# Patient Record
Sex: FEMALE | Race: WHITE | NOT HISPANIC OR LATINO | Employment: OTHER | URBAN - METROPOLITAN AREA
[De-identification: names, ages, dates, MRNs, and addresses within clinical notes are randomized per-mention and may not be internally consistent; named-entity substitution may affect disease eponyms.]

---

## 2024-10-07 DIAGNOSIS — K62.89 RECTAL MASS: Primary | ICD-10-CM

## 2024-12-20 ENCOUNTER — OFFICE VISIT (OUTPATIENT)
Dept: HEMATOLOGY/ONCOLOGY | Facility: CLINIC | Age: 64
End: 2024-12-20
Payer: MEDICAID

## 2024-12-20 VITALS
OXYGEN SATURATION: 98 % | HEIGHT: 64 IN | BODY MASS INDEX: 35.41 KG/M2 | RESPIRATION RATE: 16 BRPM | SYSTOLIC BLOOD PRESSURE: 142 MMHG | DIASTOLIC BLOOD PRESSURE: 84 MMHG | WEIGHT: 207.38 LBS | HEART RATE: 55 BPM

## 2024-12-20 DIAGNOSIS — T45.1X5A CHEMOTHERAPY-INDUCED NAUSEA AND VOMITING: ICD-10-CM

## 2024-12-20 DIAGNOSIS — C20 RECTAL CANCER: Primary | ICD-10-CM

## 2024-12-20 DIAGNOSIS — R11.2 CHEMOTHERAPY-INDUCED NAUSEA AND VOMITING: ICD-10-CM

## 2024-12-20 RX ORDER — CAPECITABINE 150 MG/1
TABLET, FILM COATED ORAL
Qty: 60 TABLET | Refills: 1 | Status: SHIPPED | OUTPATIENT
Start: 2024-12-20

## 2024-12-20 RX ORDER — ONDANSETRON HYDROCHLORIDE 8 MG/1
8 TABLET, FILM COATED ORAL EVERY 8 HOURS PRN
Qty: 60 TABLET | Refills: 6 | Status: SHIPPED | OUTPATIENT
Start: 2024-12-20 | End: 2025-12-20

## 2024-12-20 RX ORDER — CAPECITABINE 500 MG/1
TABLET, FILM COATED ORAL
Qty: 180 TABLET | Refills: 1 | Status: SHIPPED | OUTPATIENT
Start: 2024-12-20

## 2024-12-20 NOTE — PROGRESS NOTES
ONCOLOGY INITIAL VISIT CONSULTATION  Patient ID: Pernell Dominguez is a 64 y.o. female.  Patient referred by [facility] colorectal surgery, she presented to [facility] November 2020 with 2 year history of intermittent constipation and abdominal cramps. She underwent         Chief Complaint: No chief complaint on file.    Pernell Dominguez is here for [No matching plan found]  So far the patient appears to tolerate chemotherapy fairly well.  Nausea is controlled with the current anti-nausea regimen.  The patient does have mild fatigue.  Today the main concern is     Imaging:   10/2/2024 ct chest abd pelvis  Chronic appearing parenchymal findings in the lungs no acute intrathoracic findings, circumferential wall thickening in the lower rectum extending fro just past sigmoid takeoff distally to the level of the anorectal verge approx 7.9cm length, no liver disease appreciated        History reviewed. No pertinent past medical history.  Family History   Problem Relation Name Age of Onset    Lupus Mother      Stomach cancer Father      Cancer Father      No Known Problems Sister      No Known Problems Brother      Breast cancer Maternal Aunt      Bone cancer Maternal Aunt      Cervical cancer Paternal Grandmother       Social History     Socioeconomic History    Marital status:    Tobacco Use    Smoking status: Never    Smokeless tobacco: Never   Substance and Sexual Activity    Alcohol use: Not Currently    Drug use: Never    Sexual activity: Not Currently     Social Drivers of Health     Financial Resource Strain: Medium Risk (12/11/2024)    Received from [facility]    Overall Financial Resource Strain (CARDIA)     Difficulty of Paying Living Expenses: Somewhat hard   Food Insecurity: No Food Insecurity (12/11/2024)    Received from [facility] [place] [employer]    Hunger Vital Sign     Worried About Running Out of Food in the Last Year: Never true     Ran Out of Food in the Last Year: Never true   Transportation Needs: No Transportation  Needs (12/11/2024)    Received from [facility]    PRAPARE - Transportation     Lack of Transportation (Medical): No     Lack of Transportation (Non-Medical): No   Physical Activity: Unknown (12/11/2024)    Received from [facility]    Exercise Vital Sign     Days of Exercise per Week: Patient declined   Stress: Stress Concern Present (12/11/2024)    Received from [facility]    [employer] - Occupational Stress Questionnaire     Feeling of Stress : To some extent   Housing Stability: Unknown (12/11/2024)    Received from [facility]    Housing Stability Vital Sign     Unable to Pay for Housing in the Last Year: No     History reviewed. No pertinent surgical history.        Review of systems:  Review of Systems   Constitutional:  Negative for activity change, appetite change, chills, diaphoresis, fatigue, fever and unexpected weight change.   HENT:  Negative for congestion, dental problem, mouth sores, nosebleeds, sore throat, tinnitus and voice change.    Eyes:  Negative for photophobia, discharge and visual disturbance.   Respiratory:  Negative for apnea, cough, choking, chest tightness, shortness of breath, wheezing and stridor.    Cardiovascular:  Negative for chest pain, palpitations and leg swelling.   Gastrointestinal:  Negative for abdominal distention, abdominal pain, anal bleeding, blood in stool, constipation, diarrhea, nausea, rectal pain and vomiting.   Endocrine: Negative for cold intolerance and heat intolerance.   Genitourinary:  Negative for difficulty urinating, dysuria, hematuria, menstrual problem, vaginal bleeding, vaginal discharge and vaginal pain.   Musculoskeletal:  Negative for arthralgias, back pain, gait problem, joint swelling and myalgias.   Skin:  Negative for color change, pallor, rash and wound.   Neurological:  Negative for dizziness, syncope, light-headedness and numbness.   Hematological:  Negative for adenopathy. Does not bruise/bleed easily.  "  Psychiatric/Behavioral:  Negative for agitation, confusion, sleep disturbance and suicidal ideas. The patient is not nervous/anxious.      Objective:     Physical Exam  Constitutional:       Appearance: She is well-developed.   HENT:      Head: Normocephalic.   Eyes:      Conjunctiva/sclera: Conjunctivae normal.      Pupils: Pupils are equal, round, and reactive to light.   Cardiovascular:      Rate and Rhythm: Normal rate and regular rhythm.      Heart sounds: Normal heart sounds.   Pulmonary:      Effort: Pulmonary effort is normal.      Breath sounds: Normal breath sounds.   Chest:      Chest wall: No mass, deformity or tenderness.   Breasts:     Breasts are symmetrical.   Abdominal:      General: Bowel sounds are normal.      Palpations: Abdomen is soft.   Musculoskeletal:         General: Normal range of motion.      Cervical back: Normal range of motion and neck supple.   Skin:     General: Skin is warm and dry.   Neurological:      Mental Status: She is alert and oriented to person, place, and time.   Psychiatric:         Behavior: Behavior normal.         Thought Content: Thought content normal.         Judgment: Judgment normal.   Vitals:    12/20/24 1058   BP: (!) 142/84   Pulse: (!) 55   Resp: 16   Body surface area is 2.06 meters squared.    Labs:  No results found for: "WBC", "HGB", "HCT", "MCV", "LABPLAT"   No results found for: "LABPLAT"  CMP  No results found for: "NA", "K", "CL", "CO2", "GLU", "BUN", "CREATININE", "CALCIUM", "PROT", "ALBUMIN", "BILITOT", "ALKPHOS", "AST", "ALT", "ANIONGAP", "ESTGFRAFRICA", "EGFRNONAA"     Assessment:      No diagnosis found.     Rectal Cancer Stage IIIA  ([id number])   Patient had consult with Dr. Garcia for XRT recommended XRT with xeloda, will likely need short course XRT+Xeloda followed by CapeOx vs FOLFOX or FOLFIRINOX for 12-16 weeks per NCCN guidelines.  Will send out Xeloda, have patient rtc with Dr. Chaudhary prior to starting with cbc cmp and cea      To " Do:  Rtc with Dr. Chaudhary cbc cmp cea  Send xeloda for approval, Monday-Friday while on xrt - pt instucted do not start until chemo education visit and start XRT

## 2024-12-30 ENCOUNTER — TELEPHONE (OUTPATIENT)
Dept: HEMATOLOGY/ONCOLOGY | Facility: CLINIC | Age: 64
End: 2024-12-30
Payer: MEDICAID

## 2024-12-30 NOTE — TELEPHONE ENCOUNTER
Spoke with the patient states he has not received her Xeloda . I then reached out to [facility] who is filling this prescription. I spoke with Saeed who states that the 500 mg is ready for shipment awaiting the 150 mg that is currently in the verification queue with the pharmacist. Once prescription is released from the pharmacist queue they will reach out to patient to ship. Verified that they had correct telephone number for patient and provided secondary number for spouse. TTRN

## 2025-01-06 ENCOUNTER — CLINICAL SUPPORT (OUTPATIENT)
Dept: HEMATOLOGY/ONCOLOGY | Facility: CLINIC | Age: 65
End: 2025-01-06
Payer: MEDICAID

## 2025-01-06 ENCOUNTER — TELEPHONE (OUTPATIENT)
Dept: HEMATOLOGY/ONCOLOGY | Facility: CLINIC | Age: 65
End: 2025-01-06

## 2025-01-06 VITALS
HEART RATE: 57 BPM | TEMPERATURE: 98 F | WEIGHT: 205.88 LBS | RESPIRATION RATE: 16 BRPM | BODY MASS INDEX: 35.15 KG/M2 | OXYGEN SATURATION: 99 % | HEIGHT: 64 IN | SYSTOLIC BLOOD PRESSURE: 149 MMHG | DIASTOLIC BLOOD PRESSURE: 90 MMHG

## 2025-01-06 DIAGNOSIS — C20 RECTAL CANCER: Primary | ICD-10-CM

## 2025-01-06 DIAGNOSIS — Z71.9 ENCOUNTER FOR EDUCATION: ICD-10-CM

## 2025-01-06 LAB
ALBUMIN SERPL BCP-MCNC: 3.8 G/DL (ref 3.4–5)
ALBUMIN/GLOBULIN RATIO: 1.19 RATIO (ref 1.1–1.8)
ALP SERPL-CCNC: 71 U/L (ref 46–116)
ALT SERPL W P-5'-P-CCNC: 22 U/L (ref 12–78)
ANION GAP SERPL CALC-SCNC: 4 MMOL/L (ref 3–11)
AST SERPL-CCNC: 15 U/L (ref 15–37)
BASOPHILS NFR BLD: 0.7 % (ref 0–3)
BILIRUB SERPL-MCNC: 0.7 MG/DL (ref 0–1)
BUN SERPL-MCNC: 13 MG/DL (ref 7–18)
BUN/CREAT SERPL: 13.68 RATIO (ref 7–18)
CALCIUM SERPL-MCNC: 9 MG/DL (ref 8.8–10.5)
CHLORIDE SERPL-SCNC: 107 MMOL/L (ref 100–108)
CO2 SERPL-SCNC: 32 MMOL/L (ref 21–32)
CREAT SERPL-MCNC: 0.95 MG/DL (ref 0.55–1.02)
EOSINOPHIL NFR BLD: 1.6 % (ref 1–3)
ERYTHROCYTE [DISTWIDTH] IN BLOOD BY AUTOMATED COUNT: 12.6 % (ref 12.5–18)
GFR ESTIMATION: 67
GLOBULIN: 3.2 G/DL (ref 2.3–3.5)
GLUCOSE SERPL-MCNC: 88 MG/DL (ref 70–110)
HCT VFR BLD AUTO: 41.6 % (ref 37–47)
HGB BLD-MCNC: 14.1 G/DL (ref 12–16)
LYMPHOCYTES NFR BLD: 29.1 % (ref 25–40)
MCH RBC QN AUTO: 30.3 PG (ref 27–31.2)
MCHC RBC AUTO-ENTMCNC: 33.9 G/DL (ref 31.8–35.4)
MCV RBC AUTO: 89.5 FL (ref 80–97)
MONOCYTES NFR BLD: 7.6 % (ref 1–15)
NEUTROPHILS # BLD AUTO: 3.33 10*3/UL (ref 1.8–7.7)
NEUTROPHILS NFR BLD: 60.6 % (ref 37–80)
NUCLEATED RED BLOOD CELLS: 0 %
PLATELETS: 251 10*3/UL (ref 142–424)
POTASSIUM SERPL-SCNC: 4.8 MMOL/L (ref 3.6–5.2)
PROT SERPL-MCNC: 7 G/DL (ref 6.4–8.2)
RBC # BLD AUTO: 4.65 10*6/UL (ref 4.2–5.4)
SODIUM BLD-SCNC: 143 MMOL/L (ref 135–145)
WBC # BLD: 5.5 10*3/UL (ref 4.6–10.2)

## 2025-01-06 PROCEDURE — 99215 OFFICE O/P EST HI 40 MIN: CPT | Mod: S$PBB,,, | Performed by: NURSE PRACTITIONER

## 2025-01-06 PROCEDURE — G2211 COMPLEX E/M VISIT ADD ON: HCPCS | Mod: S$PBB,,, | Performed by: NURSE PRACTITIONER

## 2025-01-06 RX ORDER — PROPRANOLOL HYDROCHLORIDE 40 MG/5ML
40 SOLUTION ORAL 2 TIMES DAILY
COMMUNITY

## 2025-01-06 RX ORDER — PRAVASTATIN SODIUM 10 MG/1
10 TABLET ORAL DAILY
COMMUNITY

## 2025-01-06 RX ORDER — LEVOTHYROXINE SODIUM 100 UG/1
100 TABLET ORAL
COMMUNITY

## 2025-01-06 RX ORDER — LOSARTAN POTASSIUM 50 MG/1
50 TABLET ORAL DAILY
COMMUNITY

## 2025-01-06 NOTE — TELEPHONE ENCOUNTER
----- Message from Shaista sent at 1/6/2025 11:09 AM CST -----  Contact: RUPINDER CHARLTON [[id number]]  ..Type:  Patient Requesting Call    Who Called:RUPINDER CHARLTON [[id number]]   What is the call regarding?: pt states that radiology is requesting her to take the medicine you informed her not to. Pt wants to know what she should do.   Would the patient rather a call back or a response via [facility]? call  Best Call Back Number: [phone] (home)   Additional Information:

## 2025-01-06 NOTE — PROGRESS NOTES
ONCOLOGY FOLLOW UP VISIT CONSULTATION  Patient ID: Pernell Dominguez is a 64 y.o. female.  Patient referred by [facility] colorectal surgery, she presented to [facility] November 2020 with 2 year history of intermittent constipation and abdominal cramps. She underwent         Chief Complaint: No chief complaint on file.    Pernell Dominguez is here for [No matching plan found]  So far the patient appears to tolerate chemotherapy fairly well.  Nausea is controlled with the current anti-nausea regimen.  The patient does have mild fatigue.  Today the main concern is     Imaging:   10/2/2024 ct chest abd pelvis  Chronic appearing parenchymal findings in the lungs no acute intrathoracic findings, circumferential wall thickening in the lower rectum extending fro just past sigmoid takeoff distally to the level of the anorectal verge approx 7.9cm length, no liver disease appreciated        History reviewed. No pertinent past medical history.  Family History   Problem Relation Name Age of Onset    Lupus Mother      Stomach cancer Father      Cancer Father      No Known Problems Sister      No Known Problems Brother      Breast cancer Maternal Aunt      Bone cancer Maternal Aunt      Cervical cancer Paternal Grandmother       Social History     Socioeconomic History    Marital status:    Tobacco Use    Smoking status: Never    Smokeless tobacco: Never   Substance and Sexual Activity    Alcohol use: Not Currently    Drug use: Never    Sexual activity: Not Currently     Social Drivers of Health     Financial Resource Strain: Medium Risk (12/11/2024)    Received from [facility]    Overall Financial Resource Strain (CARDIA)     Difficulty of Paying Living Expenses: Somewhat hard   Food Insecurity: No Food Insecurity (12/11/2024)    Received from [facility] [employer]    Hunger Vital Sign     Worried About Running Out of Food in the Last Year: Never true     Ran Out of Food in the Last Year: Never true   Transportation Needs: No Transportation  Needs (12/11/2024)    Received from [facility]    PRAPARE - Transportation     Lack of Transportation (Medical): No     Lack of Transportation (Non-Medical): No   Physical Activity: Unknown (12/11/2024)    Received from [facility]    Exercise Vital Sign     Days of Exercise per Week: Patient declined   Stress: Stress Concern Present (12/11/2024)    Received from [facility]    [employer] [place] [employer] - Occupational Stress Questionnaire     Feeling of Stress : To some extent   Housing Stability: Unknown (12/11/2024)    Received from [facility]    Housing Stability Vital Sign     Unable to Pay for Housing in the Last Year: No     History reviewed. No pertinent surgical history.        Review of systems:  Review of Systems   Constitutional:  Negative for activity change, appetite change, chills, diaphoresis, fatigue, fever and unexpected weight change.   HENT:  Negative for congestion, dental problem, mouth sores, nosebleeds, sore throat, tinnitus and voice change.    Eyes:  Negative for photophobia, discharge and visual disturbance.   Respiratory:  Negative for apnea, cough, choking, chest tightness, shortness of breath, wheezing and stridor.    Cardiovascular:  Negative for chest pain, palpitations and leg swelling.   Gastrointestinal:  Negative for abdominal distention, abdominal pain, anal bleeding, blood in stool, constipation, diarrhea, nausea, rectal pain and vomiting.   Endocrine: Negative for cold intolerance and heat intolerance.   Genitourinary:  Negative for difficulty urinating, dysuria, hematuria, menstrual problem, vaginal bleeding, vaginal discharge and vaginal pain.   Musculoskeletal:  Negative for arthralgias, back pain, gait problem, joint swelling and myalgias.   Skin:  Negative for color change, pallor, rash and wound.   Neurological:  Negative for dizziness, syncope, light-headedness and numbness.   Hematological:  Negative for adenopathy. Does not bruise/bleed easily.  "  Psychiatric/Behavioral:  Negative for agitation, confusion, sleep disturbance and suicidal ideas. The patient is not nervous/anxious.      Objective:     Physical Exam  Constitutional:       Appearance: She is well-developed.   HENT:      Head: Normocephalic.   Eyes:      Conjunctiva/sclera: Conjunctivae normal.      Pupils: Pupils are equal, round, and reactive to light.   Cardiovascular:      Rate and Rhythm: Normal rate and regular rhythm.      Heart sounds: Normal heart sounds.   Pulmonary:      Effort: Pulmonary effort is normal.      Breath sounds: Normal breath sounds.   Chest:      Chest wall: No mass, deformity or tenderness.   Breasts:     Breasts are symmetrical.   Abdominal:      General: Bowel sounds are normal.      Palpations: Abdomen is soft.   Musculoskeletal:         General: Normal range of motion.      Cervical back: Normal range of motion and neck supple.   Skin:     General: Skin is warm and dry.   Neurological:      Mental Status: She is alert and oriented to person, place, and time.   Psychiatric:         Behavior: Behavior normal.         Thought Content: Thought content normal.         Judgment: Judgment normal.     Vitals:    01/06/25 1458   BP: (!) 149/90   Pulse: (!) 57   Resp: 16   Temp: 97.7 °F (36.5 °C)   Body surface area is 2.05 meters squared.    Labs:  No results found for: "WBC", "HGB", "HCT", "MCV", "LABPLAT"   No results found for: "LABPLAT"  CMP  No results found for: "NA", "K", "CL", "CO2", "GLU", "BUN", "CREATININE", "CALCIUM", "PROT", "ALBUMIN", "BILITOT", "ALKPHOS", "AST", "ALT", "ANIONGAP", "ESTGFRAFRICA", "EGFRNONAA"     Assessment:      No diagnosis found.     Rectal Cancer Stage IIIA  ([id number])   Patient had consult with Dr. Garcia for XRT recommended XRT with xeloda, will likely need short course XRT+Xeloda followed by CapeOx vs FOLFOX or FOLFIRINOX for 12-16 weeks per NCCN guidelines.  Will send out Xeloda, have patient rtc with Dr. Chaudhary prior to starting with cbc " cmp and cea  ==1/6/2025:  Patient here today to discuss upcoming chemotherapy/immunotherapy. An extensive discussion was had which included a thorough discussion of potential side effect profile, risk versus benefits, and expected outcomes.  Risks, including but not limited to, possible hair loss, bone marrow damage, damage to body organs, allergic reactions, sterility, nausea/vomiting, constipation/diarrhea, sores in the mouth, secondary cancers, and rarely death were all discuss.  Specific side effects pertaining to their chemotherapy/immunotherapy medications were discussed as well.  The patient agrees with the plan and all questions and their support systems questions have been answered to their satisfaction.  Premedications were prescribed and patient was educated on appropriate usage.  Patient was educated on when to call, and given the number to call, or to notify the provider including but not limited to:  Persistent nausea and vomiting, dehydration, fever greater than 100.4 lasting over 1 hour induration or isolated feeders greater than 101, rash while on active chemotherapy or immunotherapy, severe pain or new onset pain not controlled by current pain medication regimen.  Patient was scheduled later this week to see Dr. Chaudhary to discuss treatment planning as initial visit was completed with NP because he was out of the office and work up was ordered, however, visit moved up due to XRT is ready to start.  Dr. Chaudhary was physically present for first part of visit when he discussed Xeloda +XRT, goals of treatment, and plan for Xeloda+XRT followed by surgical evaluation and will need chemotherapy after whether FOLFOX or CapeOx.  Following MD visit chemotherapy education was provided, labs drawn today cbc cmp she will start Xeloda+xrt on 1/7/2025.       To Do:  Weekly cbc cmp  Start Xeloda M-F concurrent with XRT on 1/7/2025  Rtc 1 week cbc cmp

## 2025-01-10 LAB
ALBUMIN SERPL BCP-MCNC: 3.4 G/DL (ref 3.4–5)
ALBUMIN/GLOBULIN RATIO: 1.06 RATIO (ref 1.1–1.8)
ALP SERPL-CCNC: 67 U/L (ref 46–116)
ALT SERPL W P-5'-P-CCNC: 22 U/L (ref 12–78)
ANION GAP SERPL CALC-SCNC: 7 MMOL/L (ref 3–11)
AST SERPL-CCNC: 14 U/L (ref 15–37)
BASOPHILS NFR BLD: 0.4 % (ref 0–3)
BILIRUB SERPL-MCNC: 1.3 MG/DL (ref 0–1)
BUN SERPL-MCNC: 11 MG/DL (ref 7–18)
BUN/CREAT SERPL: 11 RATIO (ref 7–18)
CALCIUM SERPL-MCNC: 8.6 MG/DL (ref 8.8–10.5)
CHLORIDE SERPL-SCNC: 107 MMOL/L (ref 100–108)
CO2 SERPL-SCNC: 30 MMOL/L (ref 21–32)
CREAT SERPL-MCNC: 1 MG/DL (ref 0.55–1.02)
EOSINOPHIL NFR BLD: 2.2 % (ref 1–3)
ERYTHROCYTE [DISTWIDTH] IN BLOOD BY AUTOMATED COUNT: 12.3 % (ref 12.5–18)
GFR ESTIMATION: 63
GLOBULIN: 3.2 G/DL (ref 2.3–3.5)
GLUCOSE SERPL-MCNC: 105 MG/DL (ref 70–110)
HCT VFR BLD AUTO: 39.7 % (ref 37–47)
HGB BLD-MCNC: 13.6 G/DL (ref 12–16)
LYMPHOCYTES NFR BLD: 25.4 % (ref 25–40)
MCH RBC QN AUTO: 30.4 PG (ref 27–31.2)
MCHC RBC AUTO-ENTMCNC: 34.3 G/DL (ref 31.8–35.4)
MCV RBC AUTO: 88.8 FL (ref 80–97)
MONOCYTES NFR BLD: 7.9 % (ref 1–15)
NEUTROPHILS # BLD AUTO: 2.91 10*3/UL (ref 1.8–7.7)
NEUTROPHILS NFR BLD: 63.9 % (ref 37–80)
NUCLEATED RED BLOOD CELLS: 0 %
PLATELETS: 232 10*3/UL (ref 142–424)
POTASSIUM SERPL-SCNC: 3.8 MMOL/L (ref 3.6–5.2)
PROT SERPL-MCNC: 6.6 G/DL (ref 6.4–8.2)
RBC # BLD AUTO: 4.47 10*6/UL (ref 4.2–5.4)
SODIUM BLD-SCNC: 144 MMOL/L (ref 135–145)
WBC # BLD: 4.6 10*3/UL (ref 4.6–10.2)

## 2025-01-13 ENCOUNTER — OFFICE VISIT (OUTPATIENT)
Dept: HEMATOLOGY/ONCOLOGY | Facility: CLINIC | Age: 65
End: 2025-01-13
Payer: MEDICAID

## 2025-01-13 VITALS
BODY MASS INDEX: 36.16 KG/M2 | DIASTOLIC BLOOD PRESSURE: 91 MMHG | SYSTOLIC BLOOD PRESSURE: 138 MMHG | OXYGEN SATURATION: 97 % | WEIGHT: 211.81 LBS | HEART RATE: 64 BPM | RESPIRATION RATE: 16 BRPM | HEIGHT: 64 IN

## 2025-01-13 DIAGNOSIS — T45.1X5A CHEMOTHERAPY-INDUCED NAUSEA AND VOMITING: ICD-10-CM

## 2025-01-13 DIAGNOSIS — C20 RECTAL CANCER: Primary | ICD-10-CM

## 2025-01-13 DIAGNOSIS — R11.2 CHEMOTHERAPY-INDUCED NAUSEA AND VOMITING: ICD-10-CM

## 2025-01-13 PROCEDURE — G2211 COMPLEX E/M VISIT ADD ON: HCPCS | Mod: S$PBB,,, | Performed by: NURSE PRACTITIONER

## 2025-01-13 PROCEDURE — 3008F BODY MASS INDEX DOCD: CPT | Mod: CPTII,,, | Performed by: NURSE PRACTITIONER

## 2025-01-13 PROCEDURE — 99215 OFFICE O/P EST HI 40 MIN: CPT | Mod: S$PBB,,, | Performed by: NURSE PRACTITIONER

## 2025-01-13 PROCEDURE — 3075F SYST BP GE 130 - 139MM HG: CPT | Mod: CPTII,,, | Performed by: NURSE PRACTITIONER

## 2025-01-13 PROCEDURE — 3080F DIAST BP >= 90 MM HG: CPT | Mod: CPTII,,, | Performed by: NURSE PRACTITIONER

## 2025-01-13 PROCEDURE — 1159F MED LIST DOCD IN RCRD: CPT | Mod: CPTII,,, | Performed by: NURSE PRACTITIONER

## 2025-01-13 NOTE — PROGRESS NOTES
ONCOLOGY FOLLOW UP VISIT CONSULTATION  Patient ID: Pernell Dominguez is a 64 y.o. female.  Patient referred by [facility] colorectal surgery, she presented to [facility] November 2020 with 2 year history of intermittent constipation and abdominal cramps. She underwent         Chief Complaint: No chief complaint on file.    Pernell Dominguez is here for [No matching plan found]  So far the patient appears to tolerate chemotherapy fairly well.  Nausea is controlled with the current anti-nausea regimen.  The patient does have mild fatigue.  Today the main concern is     Imaging:   10/2/2024 ct chest abd pelvis  Chronic appearing parenchymal findings in the lungs no acute intrathoracic findings, circumferential wall thickening in the lower rectum extending fro just past sigmoid takeoff distally to the level of the anorectal verge approx 7.9cm length, no liver disease appreciated        No past medical history on file.  Family History   Problem Relation Name Age of Onset    Lupus Mother      Stomach cancer Father      Cancer Father      No Known Problems Sister      No Known Problems Brother      Breast cancer Maternal Aunt      Bone cancer Maternal Aunt      Cervical cancer Paternal Grandmother       Social History     Socioeconomic History    Marital status:    Tobacco Use    Smoking status: Never    Smokeless tobacco: Never   Substance and Sexual Activity    Alcohol use: Not Currently    Drug use: Never    Sexual activity: Not Currently     Social Drivers of Health     Financial Resource Strain: Medium Risk (12/11/2024)    Received from [facility]    Overall Financial Resource Strain (CARDIA)     Difficulty of Paying Living Expenses: Somewhat hard   Food Insecurity: No Food Insecurity (12/11/2024)    Received from [facility] [employer]    Hunger Vital Sign     Worried About Running Out of Food in the Last Year: Never true     Ran Out of Food in the Last Year: Never true   Transportation Needs: No Transportation Needs (12/11/2024)     Received from [facility]    PRAPARE - Transportation     Lack of Transportation (Medical): No     Lack of Transportation (Non-Medical): No   Physical Activity: Unknown (12/11/2024)    Received from [facility]    Exercise Vital Sign     Days of Exercise per Week: Patient declined   Stress: Stress Concern Present (12/11/2024)    Received from [facility]    [employer] - Occupational Stress Questionnaire     Feeling of Stress : To some extent   Housing Stability: Unknown (12/11/2024)    Received from [facility]    Housing Stability Vital Sign     Unable to Pay for Housing in the Last Year: No     No past surgical history on file.        Review of systems:  Review of Systems   Constitutional:  Negative for activity change, appetite change, chills, diaphoresis, fatigue, fever and unexpected weight change.   HENT:  Negative for congestion, dental problem, mouth sores, nosebleeds, sore throat, tinnitus and voice change.    Eyes:  Negative for photophobia, discharge and visual disturbance.   Respiratory:  Negative for apnea, cough, choking, chest tightness, shortness of breath, wheezing and stridor.    Cardiovascular:  Negative for chest pain, palpitations and leg swelling.   Gastrointestinal:  Negative for abdominal distention, abdominal pain, anal bleeding, blood in stool, constipation, diarrhea, nausea, rectal pain and vomiting.   Endocrine: Negative for cold intolerance and heat intolerance.   Genitourinary:  Negative for difficulty urinating, dysuria, hematuria, menstrual problem, vaginal bleeding, vaginal discharge and vaginal pain.   Musculoskeletal:  Negative for arthralgias, back pain, gait problem, joint swelling and myalgias.   Skin:  Negative for color change, pallor, rash and wound.   Neurological:  Negative for dizziness, syncope, light-headedness and numbness.   Hematological:  Negative for adenopathy. Does not bruise/bleed easily.   Psychiatric/Behavioral:  Negative for  agitation, confusion, sleep disturbance and suicidal ideas. The patient is not nervous/anxious.      Objective:     Physical Exam  Constitutional:       Appearance: She is well-developed.   HENT:      Head: Normocephalic.   Eyes:      Conjunctiva/sclera: Conjunctivae normal.      Pupils: Pupils are equal, round, and reactive to light.   Cardiovascular:      Rate and Rhythm: Normal rate and regular rhythm.      Heart sounds: Normal heart sounds.   Pulmonary:      Effort: Pulmonary effort is normal.      Breath sounds: Normal breath sounds.   Chest:      Chest wall: No mass, deformity or tenderness.   Breasts:     Breasts are symmetrical.   Abdominal:      General: Bowel sounds are normal.      Palpations: Abdomen is soft.   Musculoskeletal:         General: Normal range of motion.      Cervical back: Normal range of motion and neck supple.   Skin:     General: Skin is warm and dry.   Neurological:      Mental Status: She is alert and oriented to person, place, and time.   Psychiatric:         Behavior: Behavior normal.         Thought Content: Thought content normal.         Judgment: Judgment normal.     Vitals:    01/13/25 0853   BP: (!) 138/91   Pulse: 64   Resp: 16   Body surface area is 2.08 meters squared.    Labs:  Lab Results   Component Value Date    WBC 4.6 01/10/2025    HGB 13.6 01/10/2025    HCT 39.7 01/10/2025    MCV 88.8 01/10/2025    LABPLAT 232 01/10/2025      Platelets   Date Value Ref Range Status   01/10/2025 232 142 - 424 10*3/uL Final     CMP  Sodium   Date Value Ref Range Status   01/10/2025 144 135 - 145 mmol/L Final     Potassium   Date Value Ref Range Status   01/10/2025 3.8 3.6 - 5.2 mmol/L Final     Chloride   Date Value Ref Range Status   01/10/2025 107 100 - 108 mmol/L Final     CO2   Date Value Ref Range Status   01/10/2025 30 21 - 32 mmol/L Final     Glucose   Date Value Ref Range Status   01/10/2025 105 70 - 110 mg/dL Final     BUN   Date Value Ref Range Status   01/10/2025 11 7 - 18  mg/dL Final     Creatinine   Date Value Ref Range Status   01/10/2025 1.00 0.55 - 1.02 mg/dL Final     Calcium   Date Value Ref Range Status   01/10/2025 8.6 (L) 8.8 - 10.5 mg/dL Final     Total Protein   Date Value Ref Range Status   01/10/2025 6.6 6.4 - 8.2 g/dL Final     Albumin   Date Value Ref Range Status   01/10/2025 3.4 3.4 - 5.0 g/dL Final     Total Bilirubin   Date Value Ref Range Status   01/10/2025 1.3 (H) 0.0 - 1.0 mg/dL Final     Alkaline Phosphatase   Date Value Ref Range Status   01/10/2025 67 46 - 116 U/L Final     AST   Date Value Ref Range Status   01/10/2025 14 (L) 15 - 37 U/L Final     ALT   Date Value Ref Range Status   01/10/2025 22 12 - 78 U/L Final     Anion Gap   Date Value Ref Range Status   01/10/2025 7.0 3.0 - 11.0 mmol/L Final        Assessment:      No diagnosis found.     Rectal Cancer Stage IIIA  ([id number])   Patient had consult with Dr. Garcia for XRT recommended XRT with xeloda, will likely need short course XRT+Xeloda followed by CapeOx vs FOLFOX or FOLFIRINOX for 12-16 weeks per NCCN guidelines.  Will send out Xeloda, have patient rtc with Dr. Chaudhary prior to starting with cbc cmp and cea  ==1/6/2025:  Patient here today to discuss upcoming chemotherapy/immunotherapy. An extensive discussion was had which included a thorough discussion of potential side effect profile, risk versus benefits, and expected outcomes.  Risks, including but not limited to, possible hair loss, bone marrow damage, damage to body organs, allergic reactions, sterility, nausea/vomiting, constipation/diarrhea, sores in the mouth, secondary cancers, and rarely death were all discuss.  Specific side effects pertaining to their chemotherapy/immunotherapy medications were discussed as well.  The patient agrees with the plan and all questions and their support systems questions have been answered to their satisfaction.  Premedications were prescribed and patient was educated on appropriate usage.  Patient was  educated on when to call, and given the number to call, or to notify the provider including but not limited to:  Persistent nausea and vomiting, dehydration, fever greater than 100.4 lasting over 1 hour induration or isolated feeders greater than 101, rash while on active chemotherapy or immunotherapy, severe pain or new onset pain not controlled by current pain medication regimen.  Patient was scheduled later this week to see Dr. Chaudhary to discuss treatment planning as initial visit was completed with NP because he was out of the office and work up was ordered, however, visit moved up due to XRT is ready to start.  Dr. Chaudhary was physically present for first part of visit when he discussed Xeloda +XRT, goals of treatment, and plan for Xeloda+XRT followed by surgical evaluation and will need chemotherapy after whether FOLFOX or CapeOx.  Following MD visit chemotherapy education was provided, labs drawn today cbc cmp she will start Xeloda+xrt on 1/7/2025.   ==01/13/2025: Patient started Xeloda concurrent with XRT on 1/7/2025. Mild hyperbilirubiinemia noted, bili 1.3. If increases to will consider interruption and dose reduction.  She had mild discomfort to hands a couple times, no erythema, peeling or cracking to hands or feet.  Plan to continue Xeloda concurrent with xrt      To Do:  Weekly cbc cmp  Continue Xeloda M-F concurrent with XRT started on 1/7/2025  Rtc 1 week cbc cmp      Total time spent in counseling and discussion about further management options including relevant lab work, treatment,  prognosis, medications and intended side effects was more than 40 minutes. More than 50% of the time was spent on counseling and coordination of care.  This includes face to face time and non-face to face time preparing to see the patient (eg, review of tests), Obtaining and/or reviewing separately obtained history, Documenting clinical information in the electronic or other health record, Independently interpreting  resultsand communicating results to the patient/family/caregiver, or Care coordination.

## 2025-01-17 LAB
ALBUMIN SERPL BCP-MCNC: 3.3 G/DL (ref 3.4–5)
ALBUMIN/GLOBULIN RATIO: 0.97 RATIO (ref 1.1–1.8)
ALP SERPL-CCNC: 61 U/L (ref 46–116)
ALT SERPL W P-5'-P-CCNC: 19 U/L (ref 12–78)
ANION GAP SERPL CALC-SCNC: 5 MMOL/L (ref 3–11)
AST SERPL-CCNC: 14 U/L (ref 15–37)
BASOPHILS NFR BLD: 0.3 % (ref 0–3)
BILIRUB SERPL-MCNC: 1.2 MG/DL (ref 0–1)
BUN SERPL-MCNC: 10 MG/DL (ref 7–18)
BUN/CREAT SERPL: 10.52 RATIO (ref 7–18)
CALCIUM SERPL-MCNC: 8.4 MG/DL (ref 8.8–10.5)
CHLORIDE SERPL-SCNC: 107 MMOL/L (ref 100–108)
CO2 SERPL-SCNC: 31 MMOL/L (ref 21–32)
CREAT SERPL-MCNC: 0.95 MG/DL (ref 0.55–1.02)
EOSINOPHIL NFR BLD: 4.8 % (ref 1–3)
ERYTHROCYTE [DISTWIDTH] IN BLOOD BY AUTOMATED COUNT: 12.7 % (ref 12.5–18)
GFR ESTIMATION: 67
GLOBULIN: 3.4 G/DL (ref 2.3–3.5)
GLUCOSE SERPL-MCNC: 78 MG/DL (ref 70–110)
HCT VFR BLD AUTO: 37.1 % (ref 37–47)
HGB BLD-MCNC: 12.7 G/DL (ref 12–16)
LYMPHOCYTES NFR BLD: 24.6 % (ref 25–40)
MCH RBC QN AUTO: 30.5 PG (ref 27–31.2)
MCHC RBC AUTO-ENTMCNC: 34.2 G/DL (ref 31.8–35.4)
MCV RBC AUTO: 89.2 FL (ref 80–97)
MONOCYTES NFR BLD: 9.3 % (ref 1–15)
NEUTROPHILS # BLD AUTO: 2.02 10*3/UL (ref 1.8–7.7)
NEUTROPHILS NFR BLD: 60.7 % (ref 37–80)
NUCLEATED RED BLOOD CELLS: 0 %
PLATELETS: 192 10*3/UL (ref 142–424)
POTASSIUM SERPL-SCNC: 4.1 MMOL/L (ref 3.6–5.2)
PROT SERPL-MCNC: 6.7 G/DL (ref 6.4–8.2)
RBC # BLD AUTO: 4.16 10*6/UL (ref 4.2–5.4)
SODIUM BLD-SCNC: 143 MMOL/L (ref 135–145)
WBC # BLD: 3.3 10*3/UL (ref 4.6–10.2)

## 2025-01-23 ENCOUNTER — OFFICE VISIT (OUTPATIENT)
Dept: HEMATOLOGY/ONCOLOGY | Facility: CLINIC | Age: 65
End: 2025-01-23
Payer: MEDICAID

## 2025-01-23 VITALS
WEIGHT: 210.81 LBS | DIASTOLIC BLOOD PRESSURE: 87 MMHG | SYSTOLIC BLOOD PRESSURE: 138 MMHG | HEART RATE: 59 BPM | BODY MASS INDEX: 35.99 KG/M2 | RESPIRATION RATE: 16 BRPM | OXYGEN SATURATION: 95 % | HEIGHT: 64 IN

## 2025-01-23 DIAGNOSIS — C20 RECTAL CANCER: Primary | ICD-10-CM

## 2025-01-23 DIAGNOSIS — R11.2 CHEMOTHERAPY-INDUCED NAUSEA AND VOMITING: ICD-10-CM

## 2025-01-23 DIAGNOSIS — T45.1X5A CHEMOTHERAPY-INDUCED NAUSEA AND VOMITING: ICD-10-CM

## 2025-01-23 PROCEDURE — 3008F BODY MASS INDEX DOCD: CPT | Mod: CPTII,,, | Performed by: NURSE PRACTITIONER

## 2025-01-23 PROCEDURE — 99215 OFFICE O/P EST HI 40 MIN: CPT | Mod: S$PBB,,, | Performed by: NURSE PRACTITIONER

## 2025-01-23 PROCEDURE — 1159F MED LIST DOCD IN RCRD: CPT | Mod: CPTII,,, | Performed by: NURSE PRACTITIONER

## 2025-01-23 PROCEDURE — 3075F SYST BP GE 130 - 139MM HG: CPT | Mod: CPTII,,, | Performed by: NURSE PRACTITIONER

## 2025-01-23 PROCEDURE — G2211 COMPLEX E/M VISIT ADD ON: HCPCS | Mod: S$PBB,,, | Performed by: NURSE PRACTITIONER

## 2025-01-23 PROCEDURE — 3079F DIAST BP 80-89 MM HG: CPT | Mod: CPTII,,, | Performed by: NURSE PRACTITIONER

## 2025-01-23 NOTE — PROGRESS NOTES
ONCOLOGY FOLLOW UP VISIT CONSULTATION  Patient ID: Pernell Dominguez is a 64 y.o. female.  Patient referred by [facility] colorectal surgery, she presented to [facility] November 2020 with 2 year history of intermittent constipation and abdominal cramps. She underwent         Chief Complaint: No chief complaint on file.    Pernell Dominguez is here for [No matching plan found]  So far the patient appears to tolerate chemotherapy fairly well.  Nausea is controlled with the current anti-nausea regimen.  The patient does have mild fatigue.  Today the main concern is     Imaging:   10/2/2024 ct chest abd pelvis  Chronic appearing parenchymal findings in the lungs no acute intrathoracic findings, circumferential wall thickening in the lower rectum extending fro just past sigmoid takeoff distally to the level of the anorectal verge approx 7.9cm length, no liver disease appreciated        History reviewed. No pertinent past medical history.  Family History   Problem Relation Name Age of Onset    Lupus Mother      Stomach cancer Father      Cancer Father      No Known Problems Sister      No Known Problems Brother      Breast cancer Maternal Aunt      Bone cancer Maternal Aunt      Cervical cancer Paternal Grandmother       Social History     Socioeconomic History    Marital status:    Tobacco Use    Smoking status: Never    Smokeless tobacco: Never   Substance and Sexual Activity    Alcohol use: Not Currently    Drug use: Never    Sexual activity: Not Currently     Social Drivers of Health     Financial Resource Strain: Medium Risk (12/11/2024)    Received from [facility]    Overall Financial Resource Strain (CARDIA)     Difficulty of Paying Living Expenses: Somewhat hard   Food Insecurity: No Food Insecurity (12/11/2024)    Received from [facility] [employer]    Hunger Vital Sign     Worried About Running Out of Food in the Last Year: Never true     Ran Out of Food in the Last Year: Never true   Transportation Needs: No Transportation  Needs (12/11/2024)    Received from [facility]    PRAPARE - Transportation     Lack of Transportation (Medical): No     Lack of Transportation (Non-Medical): No   Physical Activity: Unknown (12/11/2024)    Received from [facility]    Exercise Vital Sign     Days of Exercise per Week: Patient declined   Stress: Stress Concern Present (12/11/2024)    Received from [facility]    [employer] - Occupational Stress Questionnaire     Feeling of Stress : To some extent   Housing Stability: Unknown (12/11/2024)    Received from [facility]    Housing Stability Vital Sign     Unable to Pay for Housing in the Last Year: No     History reviewed. No pertinent surgical history.        Review of systems:  Review of Systems   Constitutional:  Negative for activity change, appetite change, chills, diaphoresis, fatigue, fever and unexpected weight change.   HENT:  Negative for congestion, dental problem, mouth sores, nosebleeds, sore throat, tinnitus and voice change.    Eyes:  Negative for photophobia, discharge and visual disturbance.   Respiratory:  Negative for apnea, cough, choking, chest tightness, shortness of breath, wheezing and stridor.    Cardiovascular:  Negative for chest pain, palpitations and leg swelling.   Gastrointestinal:  Negative for abdominal distention, abdominal pain, anal bleeding, blood in stool, constipation, diarrhea, nausea, rectal pain and vomiting.   Endocrine: Negative for cold intolerance and heat intolerance.   Genitourinary:  Negative for difficulty urinating, dysuria, hematuria, menstrual problem, vaginal bleeding, vaginal discharge and vaginal pain.   Musculoskeletal:  Negative for arthralgias, back pain, gait problem, joint swelling and myalgias.   Skin:  Negative for color change, pallor, rash and wound.   Neurological:  Negative for dizziness, syncope, light-headedness and numbness.   Hematological:  Negative for adenopathy. Does not bruise/bleed easily.    Psychiatric/Behavioral:  Negative for agitation, confusion, sleep disturbance and suicidal ideas. The patient is not nervous/anxious.      Objective:     Physical Exam  Constitutional:       Appearance: She is well-developed.   HENT:      Head: Normocephalic.   Eyes:      Conjunctiva/sclera: Conjunctivae normal.      Pupils: Pupils are equal, round, and reactive to light.   Cardiovascular:      Rate and Rhythm: Normal rate and regular rhythm.      Heart sounds: Normal heart sounds.   Pulmonary:      Effort: Pulmonary effort is normal.      Breath sounds: Normal breath sounds.   Chest:      Chest wall: No mass, deformity or tenderness.   Breasts:     Breasts are symmetrical.   Abdominal:      General: Bowel sounds are normal.      Palpations: Abdomen is soft.   Musculoskeletal:         General: Normal range of motion.      Cervical back: Normal range of motion and neck supple.   Skin:     General: Skin is warm and dry.   Neurological:      Mental Status: She is alert and oriented to person, place, and time.   Psychiatric:         Behavior: Behavior normal.         Thought Content: Thought content normal.         Judgment: Judgment normal.     Vitals:    01/23/25 1441   BP: 138/87   Pulse: (!) 59   Resp: 16   Body surface area is 2.08 meters squared.    Labs:  Lab Results   Component Value Date    WBC 3.3 (L) 01/17/2025    HGB 12.7 01/17/2025    HCT 37.1 01/17/2025    MCV 89.2 01/17/2025    LABPLAT 192 01/17/2025      Platelets   Date Value Ref Range Status   01/17/2025 192 142 - 424 10*3/uL Final     CMP  Sodium   Date Value Ref Range Status   01/17/2025 143 135 - 145 mmol/L Final     Potassium   Date Value Ref Range Status   01/17/2025 4.1 3.6 - 5.2 mmol/L Final     Chloride   Date Value Ref Range Status   01/17/2025 107 100 - 108 mmol/L Final     CO2   Date Value Ref Range Status   01/17/2025 31 21 - 32 mmol/L Final     Glucose   Date Value Ref Range Status   01/17/2025 78 70 - 110 mg/dL Final     BUN   Date  Value Ref Range Status   01/17/2025 10 7 - 18 mg/dL Final     Creatinine   Date Value Ref Range Status   01/17/2025 0.95 0.55 - 1.02 mg/dL Final     Calcium   Date Value Ref Range Status   01/17/2025 8.4 (L) 8.8 - 10.5 mg/dL Final     Total Protein   Date Value Ref Range Status   01/17/2025 6.7 6.4 - 8.2 g/dL Final     Albumin   Date Value Ref Range Status   01/17/2025 3.3 (L) 3.4 - 5.0 g/dL Final     Total Bilirubin   Date Value Ref Range Status   01/17/2025 1.2 (H) 0.0 - 1.0 mg/dL Final     Alkaline Phosphatase   Date Value Ref Range Status   01/17/2025 61 46 - 116 U/L Final     AST   Date Value Ref Range Status   01/17/2025 14 (L) 15 - 37 U/L Final     ALT   Date Value Ref Range Status   01/17/2025 19 12 - 78 U/L Final     Anion Gap   Date Value Ref Range Status   01/17/2025 5.0 3.0 - 11.0 mmol/L Final        Assessment:      No diagnosis found.     Rectal Cancer Stage IIIA  ([id number])   Patient had consult with Dr. Garcia for XRT recommended XRT with xeloda, will likely need short course XRT+Xeloda followed by CapeOx vs FOLFOX or FOLFIRINOX for 12-16 weeks per NCCN guidelines.  Will send out Xeloda, have patient rtc with Dr. Chaudhary prior to starting with cbc cmp and cea  ==1/6/2025:  Patient here today to discuss upcoming chemotherapy/immunotherapy. An extensive discussion was had which included a thorough discussion of potential side effect profile, risk versus benefits, and expected outcomes.  Risks, including but not limited to, possible hair loss, bone marrow damage, damage to body organs, allergic reactions, sterility, nausea/vomiting, constipation/diarrhea, sores in the mouth, secondary cancers, and rarely death were all discuss.  Specific side effects pertaining to their chemotherapy/immunotherapy medications were discussed as well.  The patient agrees with the plan and all questions and their support systems questions have been answered to their satisfaction.  Premedications were prescribed and patient was  educated on appropriate usage.  Patient was educated on when to call, and given the number to call, or to notify the provider including but not limited to:  Persistent nausea and vomiting, dehydration, fever greater than 100.4 lasting over 1 hour induration or isolated feeders greater than 101, rash while on active chemotherapy or immunotherapy, severe pain or new onset pain not controlled by current pain medication regimen.  Patient was scheduled later this week to see Dr. Chaudhary to discuss treatment planning as initial visit was completed with NP because he was out of the office and work up was ordered, however, visit moved up due to XRT is ready to start.  Dr. Chaudhary was physically present for first part of visit when he discussed Xeloda +XRT, goals of treatment, and plan for Xeloda+XRT followed by surgical evaluation and will need chemotherapy after whether FOLFOX or CapeOx.  Following MD visit chemotherapy education was provided, labs drawn today cbc cmp she will start Xeloda+xrt on 1/7/2025.   ==01/13/2025: Patient started Xeloda concurrent with XRT on 1/7/2025. Mild hyperbilirubiinemia noted, bili 1.3. If increases to will consider interruption and dose reduction.  She had mild discomfort to hands a couple times, no erythema, peeling or cracking to hands or feet.  Plan to continue Xeloda concurrent with xrt  ==01/23/2025: Tolerating Xeloda with XRT well, no complaints, bili improved to 1.2. labs reviewed will continue xeloda with xrt      To Do:  Weekly cbc cmp  Continue Xeloda M-F concurrent with XRT started on 1/7/2025  Rtc 2 weeks      Total time spent in counseling and discussion about further management options including relevant lab work, treatment,  prognosis, medications and intended side effects was more than 40 minutes. More than 50% of the time was spent on counseling and coordination of care.  This includes face to face time and non-face to face time preparing to see the patient (eg, review of  tests), Obtaining and/or reviewing separately obtained history, Documenting clinical information in the electronic or other health record, Independently interpreting resultsand communicating results to the patient/family/caregiver, or Care coordination.

## 2025-01-30 LAB
ALBUMIN SERPL BCP-MCNC: 3.3 G/DL (ref 3.4–5)
ALBUMIN/GLOBULIN RATIO: 1.06 RATIO (ref 1.1–1.8)
ALP SERPL-CCNC: 59 U/L (ref 46–116)
ALT SERPL W P-5'-P-CCNC: 20 U/L (ref 12–78)
ANION GAP SERPL CALC-SCNC: 5 MMOL/L (ref 3–11)
AST SERPL-CCNC: 16 U/L (ref 15–37)
BASOPHILS NFR BLD: 0.6 % (ref 0–3)
BILIRUB SERPL-MCNC: 1.3 MG/DL (ref 0–1)
BUN SERPL-MCNC: 12 MG/DL (ref 7–18)
BUN/CREAT SERPL: 16 RATIO (ref 7–18)
CALCIUM SERPL-MCNC: 8.3 MG/DL (ref 8.8–10.5)
CHLORIDE SERPL-SCNC: 105 MMOL/L (ref 100–108)
CO2 SERPL-SCNC: 31 MMOL/L (ref 21–32)
CREAT SERPL-MCNC: 0.75 MG/DL (ref 0.55–1.02)
EOSINOPHIL NFR BLD: 5.1 % (ref 1–3)
ERYTHROCYTE [DISTWIDTH] IN BLOOD BY AUTOMATED COUNT: 14.2 % (ref 12.5–18)
GFR ESTIMATION: 89
GLOBULIN: 3.1 G/DL (ref 2.3–3.5)
GLUCOSE SERPL-MCNC: 91 MG/DL (ref 70–110)
HCT VFR BLD AUTO: 38.4 % (ref 37–47)
HGB BLD-MCNC: 13 G/DL (ref 12–16)
LYMPHOCYTES NFR BLD: 15.3 % (ref 25–40)
MCH RBC QN AUTO: 31 PG (ref 27–31.2)
MCHC RBC AUTO-ENTMCNC: 33.9 G/DL (ref 31.8–35.4)
MCV RBC AUTO: 91.4 FL (ref 80–97)
MONOCYTES NFR BLD: 12.3 % (ref 1–15)
NEUTROPHILS # BLD AUTO: 2.23 10*3/UL (ref 1.8–7.7)
NEUTROPHILS NFR BLD: 66.7 % (ref 37–80)
NUCLEATED RED BLOOD CELLS: 0 %
PLATELETS: 163 10*3/UL (ref 142–424)
POTASSIUM SERPL-SCNC: 4.4 MMOL/L (ref 3.6–5.2)
PROT SERPL-MCNC: 6.4 G/DL (ref 6.4–8.2)
RBC # BLD AUTO: 4.2 10*6/UL (ref 4.2–5.4)
SODIUM BLD-SCNC: 141 MMOL/L (ref 135–145)
WBC # BLD: 3.3 10*3/UL (ref 4.6–10.2)

## 2025-02-03 DIAGNOSIS — C20 RECTAL CANCER: ICD-10-CM

## 2025-02-03 RX ORDER — CAPECITABINE 500 MG/1
TABLET, FILM COATED ORAL
Qty: 90 TABLET | Refills: 0 | Status: SHIPPED | OUTPATIENT
Start: 2025-02-03 | End: 2025-02-20

## 2025-02-03 RX ORDER — CAPECITABINE 150 MG/1
TABLET, FILM COATED ORAL
Qty: 30 TABLET | Refills: 0 | Status: SHIPPED | OUTPATIENT
Start: 2025-02-03 | End: 2025-02-20

## 2025-02-05 LAB
ALBUMIN SERPL BCP-MCNC: 3.4 G/DL (ref 3.4–5)
ALBUMIN/GLOBULIN RATIO: 1.1 RATIO (ref 1.1–1.8)
ALP SERPL-CCNC: 59 U/L (ref 46–116)
ALT SERPL W P-5'-P-CCNC: 20 U/L (ref 12–78)
ANION GAP SERPL CALC-SCNC: 4 MMOL/L (ref 3–11)
AST SERPL-CCNC: 16 U/L (ref 15–37)
BASOPHILS NFR BLD: 0.7 % (ref 0–3)
BILIRUB SERPL-MCNC: 1.7 MG/DL (ref 0–1)
BUN SERPL-MCNC: 11 MG/DL (ref 7–18)
BUN/CREAT SERPL: 12.08 RATIO (ref 7–18)
CALCIUM SERPL-MCNC: 8.6 MG/DL (ref 8.8–10.5)
CHLORIDE SERPL-SCNC: 105 MMOL/L (ref 100–108)
CO2 SERPL-SCNC: 33 MMOL/L (ref 21–32)
CREAT SERPL-MCNC: 0.91 MG/DL (ref 0.55–1.02)
EOSINOPHIL NFR BLD: 11 % (ref 1–3)
ERYTHROCYTE [DISTWIDTH] IN BLOOD BY AUTOMATED COUNT: 15.1 % (ref 12.5–18)
GFR ESTIMATION: 71
GLOBULIN: 3.1 G/DL (ref 2.3–3.5)
GLUCOSE SERPL-MCNC: 109 MG/DL (ref 70–110)
HCT VFR BLD AUTO: 36.7 % (ref 37–47)
HGB BLD-MCNC: 12.5 G/DL (ref 12–16)
LYMPHOCYTES NFR BLD: 16.3 % (ref 25–40)
MCH RBC QN AUTO: 31.3 PG (ref 27–31.2)
MCHC RBC AUTO-ENTMCNC: 34.1 G/DL (ref 31.8–35.4)
MCV RBC AUTO: 91.8 FL (ref 80–97)
MONOCYTES NFR BLD: 12.7 % (ref 1–15)
NEUTROPHILS # BLD AUTO: 1.67 10*3/UL (ref 1.8–7.7)
NEUTROPHILS NFR BLD: 58.9 % (ref 37–80)
NUCLEATED RED BLOOD CELLS: 0 %
PLATELETS: 183 10*3/UL (ref 142–424)
POTASSIUM SERPL-SCNC: 4.1 MMOL/L (ref 3.6–5.2)
PROT SERPL-MCNC: 6.5 G/DL (ref 6.4–8.2)
RBC # BLD AUTO: 4 10*6/UL (ref 4.2–5.4)
SODIUM BLD-SCNC: 142 MMOL/L (ref 135–145)
WBC # BLD: 2.8 10*3/UL (ref 4.6–10.2)

## 2025-02-06 ENCOUNTER — OFFICE VISIT (OUTPATIENT)
Dept: HEMATOLOGY/ONCOLOGY | Facility: CLINIC | Age: 65
End: 2025-02-06
Payer: MEDICAID

## 2025-02-06 VITALS
BODY MASS INDEX: 35.37 KG/M2 | HEIGHT: 64 IN | OXYGEN SATURATION: 94 % | RESPIRATION RATE: 16 BRPM | DIASTOLIC BLOOD PRESSURE: 82 MMHG | HEART RATE: 54 BPM | SYSTOLIC BLOOD PRESSURE: 132 MMHG | TEMPERATURE: 98 F | WEIGHT: 207.19 LBS

## 2025-02-06 DIAGNOSIS — C20 RECTAL CANCER: Primary | ICD-10-CM

## 2025-02-06 DIAGNOSIS — T45.1X5A CHEMOTHERAPY-INDUCED NAUSEA AND VOMITING: ICD-10-CM

## 2025-02-06 DIAGNOSIS — R11.2 CHEMOTHERAPY-INDUCED NAUSEA AND VOMITING: ICD-10-CM

## 2025-02-06 PROCEDURE — 1159F MED LIST DOCD IN RCRD: CPT | Mod: CPTII,,, | Performed by: NURSE PRACTITIONER

## 2025-02-06 PROCEDURE — 3079F DIAST BP 80-89 MM HG: CPT | Mod: CPTII,,, | Performed by: NURSE PRACTITIONER

## 2025-02-06 PROCEDURE — G2211 COMPLEX E/M VISIT ADD ON: HCPCS | Mod: S$PBB,,, | Performed by: NURSE PRACTITIONER

## 2025-02-06 PROCEDURE — 99214 OFFICE O/P EST MOD 30 MIN: CPT | Mod: S$PBB,,, | Performed by: NURSE PRACTITIONER

## 2025-02-06 PROCEDURE — 3075F SYST BP GE 130 - 139MM HG: CPT | Mod: CPTII,,, | Performed by: NURSE PRACTITIONER

## 2025-02-06 PROCEDURE — 3008F BODY MASS INDEX DOCD: CPT | Mod: CPTII,,, | Performed by: NURSE PRACTITIONER

## 2025-02-06 NOTE — PROGRESS NOTES
ONCOLOGY FOLLOW UP VISIT CONSULTATION  Patient ID: Pernell Dominguez is a 64 y.o. female.  Patient referred by [facility] colorectal surgery, she presented to [facility] November 2020 with 2 year history of intermittent constipation and abdominal cramps. She underwent         Chief Complaint: No chief complaint on file.    Pernell Dominguez is here for [No matching plan found]  So far the patient appears to tolerate chemotherapy fairly well.  Nausea is controlled with the current anti-nausea regimen.  The patient does have mild fatigue.  Today the main concern is     Imaging:   10/2/2024 ct chest abd pelvis  Chronic appearing parenchymal findings in the lungs no acute intrathoracic findings, circumferential wall thickening in the lower rectum extending fro just past sigmoid takeoff distally to the level of the anorectal verge approx 7.9cm length, no liver disease appreciated        No past medical history on file.  Family History   Problem Relation Name Age of Onset    Lupus Mother      Stomach cancer Father      Cancer Father      No Known Problems Sister      No Known Problems Brother      Breast cancer Maternal Aunt      Bone cancer Maternal Aunt      Cervical cancer Paternal Grandmother       Social History     Socioeconomic History    Marital status:    Tobacco Use    Smoking status: Never    Smokeless tobacco: Never   Substance and Sexual Activity    Alcohol use: Not Currently    Drug use: Never    Sexual activity: Not Currently     Social Drivers of Health     Financial Resource Strain: Medium Risk (12/11/2024)    Received from [facility]    Overall Financial Resource Strain (CARDIA)     Difficulty of Paying Living Expenses: Somewhat hard   Food Insecurity: No Food Insecurity (12/11/2024)    [facility]    Hunger Vital Sign     Worried About Running Out of Food in the Last Year: Never true     Ran Out of Food in the Last Year: Never true   Transportation Needs: No Transportation Needs (12/11/2024)     Received from [facility]    PRAPARE - Transportation     Lack of Transportation (Medical): No     Lack of Transportation (Non-Medical): No   Physical Activity: Unknown (12/11/2024)    Received from [facility]    Exercise Vital Sign     Days of Exercise per Week: Patient declined   Stress: Stress Concern Present (12/11/2024)    Received from [facility]    [employer] - Occupational Stress Questionnaire     Feeling of Stress : To some extent   Housing Stability: Unknown (12/11/2024)    Received from [facility]    Housing Stability Vital Sign     Unable to Pay for Housing in the Last Year: No     No past surgical history on file.        Review of systems:  Review of Systems   Constitutional:  Negative for activity change, appetite change, chills, diaphoresis, fatigue, fever and unexpected weight change.   HENT:  Negative for congestion, dental problem, mouth sores, nosebleeds, sore throat, tinnitus and voice change.    Eyes:  Negative for photophobia, discharge and visual disturbance.   Respiratory:  Negative for apnea, cough, choking, chest tightness, shortness of breath, wheezing and stridor.    Cardiovascular:  Negative for chest pain, palpitations and leg swelling.   Gastrointestinal:  Negative for abdominal distention, abdominal pain, anal bleeding, blood in stool, constipation, diarrhea, nausea, rectal pain and vomiting.   Endocrine: Negative for cold intolerance and heat intolerance.   Genitourinary:  Negative for difficulty urinating, dysuria, hematuria, menstrual problem, vaginal bleeding, vaginal discharge and vaginal pain.   Musculoskeletal:  Negative for arthralgias, back pain, gait problem, joint swelling and myalgias.   Skin:  Negative for color change, pallor, rash and wound.   Neurological:  Negative for dizziness, syncope, light-headedness and numbness.   Hematological:  Negative for adenopathy. Does not bruise/bleed easily.   Psychiatric/Behavioral:  Negative for  agitation, confusion, sleep disturbance and suicidal ideas. The patient is not nervous/anxious.      Objective:     Physical Exam  Constitutional:       Appearance: She is well-developed.   HENT:      Head: Normocephalic.   Eyes:      Conjunctiva/sclera: Conjunctivae normal.      Pupils: Pupils are equal, round, and reactive to light.   Cardiovascular:      Rate and Rhythm: Normal rate and regular rhythm.      Heart sounds: Normal heart sounds.   Pulmonary:      Effort: Pulmonary effort is normal.      Breath sounds: Normal breath sounds.   Chest:      Chest wall: No mass, deformity or tenderness.   Breasts:     Breasts are symmetrical.   Abdominal:      General: Bowel sounds are normal.      Palpations: Abdomen is soft.   Musculoskeletal:         General: Normal range of motion.      Cervical back: Normal range of motion and neck supple.   Skin:     General: Skin is warm and dry.   Neurological:      Mental Status: She is alert and oriented to person, place, and time.   Psychiatric:         Behavior: Behavior normal.         Thought Content: Thought content normal.         Judgment: Judgment normal.     There were no vitals filed for this visit.  There is no height or weight on file to calculate BSA.    Labs:  Lab Results   Component Value Date    WBC 2.8 (L) 02/05/2025    HGB 12.5 02/05/2025    HCT 36.7 (L) 02/05/2025    MCV 91.8 02/05/2025    LABPLAT 183 02/05/2025      Platelets   Date Value Ref Range Status   02/05/2025 183 142 - 424 10*3/uL Final     CMP  Sodium   Date Value Ref Range Status   02/05/2025 142 135 - 145 mmol/L Final     Potassium   Date Value Ref Range Status   02/05/2025 4.1 3.6 - 5.2 mmol/L Final     Chloride   Date Value Ref Range Status   02/05/2025 105 100 - 108 mmol/L Final     CO2   Date Value Ref Range Status   02/05/2025 33 (H) 21 - 32 mmol/L Final     Glucose   Date Value Ref Range Status   02/05/2025 109 70 - 110 mg/dL Final     BUN   Date Value Ref Range Status   02/05/2025 11 7 - 18  mg/dL Final     Creatinine   Date Value Ref Range Status   02/05/2025 0.91 0.55 - 1.02 mg/dL Final     Calcium   Date Value Ref Range Status   02/05/2025 8.6 (L) 8.8 - 10.5 mg/dL Final     Total Protein   Date Value Ref Range Status   02/05/2025 6.5 6.4 - 8.2 g/dL Final     Albumin   Date Value Ref Range Status   02/05/2025 3.4 3.4 - 5.0 g/dL Final     Total Bilirubin   Date Value Ref Range Status   02/05/2025 1.7 (H) 0.0 - 1.0 mg/dL Final     Alkaline Phosphatase   Date Value Ref Range Status   02/05/2025 59 46 - 116 U/L Final     AST   Date Value Ref Range Status   02/05/2025 16 15 - 37 U/L Final     ALT   Date Value Ref Range Status   02/05/2025 20 12 - 78 U/L Final     Anion Gap   Date Value Ref Range Status   02/05/2025 4.0 3.0 - 11.0 mmol/L Final        Assessment:      No diagnosis found.     Rectal Cancer Stage IIIA  ([id number])   Patient had consult with Dr. Garcia for XRT recommended XRT with xeloda, will likely need short course XRT+Xeloda followed by CapeOx vs FOLFOX or FOLFIRINOX for 12-16 weeks per NCCN guidelines.  Will send out Xeloda, have patient rtc with Dr. Chaudhary prior to starting with cbc cmp and cea  ==1/6/2025:  Patient here today to discuss upcoming chemotherapy/immunotherapy. An extensive discussion was had which included a thorough discussion of potential side effect profile, risk versus benefits, and expected outcomes.  Risks, including but not limited to, possible hair loss, bone marrow damage, damage to body organs, allergic reactions, sterility, nausea/vomiting, constipation/diarrhea, sores in the mouth, secondary cancers, and rarely death were all discuss.  Specific side effects pertaining to their chemotherapy/immunotherapy medications were discussed as well.  The patient agrees with the plan and all questions and their support systems questions have been answered to their satisfaction.  Premedications were prescribed and patient was educated on appropriate usage.  Patient was educated  on when to call, and given the number to call, or to notify the provider including but not limited to:  Persistent nausea and vomiting, dehydration, fever greater than 100.4 lasting over 1 hour induration or isolated feeders greater than 101, rash while on active chemotherapy or immunotherapy, severe pain or new onset pain not controlled by current pain medication regimen.  Patient was scheduled later this week to see Dr. Chaudhary to discuss treatment planning as initial visit was completed with NP because he was out of the office and work up was ordered, however, visit moved up due to XRT is ready to start.  Dr. Chaudhary was physically present for first part of visit when he discussed Xeloda +XRT, goals of treatment, and plan for Xeloda+XRT followed by surgical evaluation and will need chemotherapy after whether FOLFOX or CapeOx.  Following MD visit chemotherapy education was provided, labs drawn today cbc cmp she will start Xeloda+xrt on 1/7/2025.   ==02/06/2025:Tolerating Xeloda with XRT well, will finish on 2/14/2025.  She will need to rtc to discuss with Dr. Chaudhary treatment planning. She has next appt with surgeon in [facility] in April.        To Do:  Weekly cbc cmp  Continue Xeloda M-F concurrent with XRT started on 1/7/2025  Rtc 2 weeks      Total time spent in counseling and discussion about further management options including relevant lab work, treatment,  prognosis, medications and intended side effects was more than 30 minutes. More than 50% of the time was spent on counseling and coordination of care.  This includes face to face time and non-face to face time preparing to see the patient (eg, review of tests), Obtaining and/or reviewing separately obtained history, Documenting clinical information in the electronic or other health record, Independently interpreting resultsand communicating results to the patient/family/caregiver, or Care coordination.

## 2025-02-12 LAB
ALBUMIN SERPL BCP-MCNC: 3.2 G/DL (ref 3.4–5)
ALBUMIN/GLOBULIN RATIO: 1.14 RATIO (ref 1.1–1.8)
ALP SERPL-CCNC: 54 U/L (ref 46–116)
ALT SERPL W P-5'-P-CCNC: 19 U/L (ref 12–78)
ANION GAP SERPL CALC-SCNC: 5 MMOL/L (ref 3–11)
AST SERPL-CCNC: 15 U/L (ref 15–37)
BASOPHILS NFR BLD: 0.3 % (ref 0–3)
BILIRUB SERPL-MCNC: 1.4 MG/DL (ref 0–1)
BUN SERPL-MCNC: 12 MG/DL (ref 7–18)
BUN/CREAT SERPL: 15.78 RATIO (ref 7–18)
CALCIUM SERPL-MCNC: 8.2 MG/DL (ref 8.8–10.5)
CHLORIDE SERPL-SCNC: 108 MMOL/L (ref 100–108)
CO2 SERPL-SCNC: 31 MMOL/L (ref 21–32)
CREAT SERPL-MCNC: 0.76 MG/DL (ref 0.55–1.02)
EOSINOPHIL NFR BLD: 9.4 % (ref 1–3)
ERYTHROCYTE [DISTWIDTH] IN BLOOD BY AUTOMATED COUNT: 16.3 % (ref 12.5–18)
GFR ESTIMATION: 88
GLOBULIN: 2.8 G/DL (ref 2.3–3.5)
GLUCOSE SERPL-MCNC: 81 MG/DL (ref 70–110)
HCT VFR BLD AUTO: 34.6 % (ref 37–47)
HGB BLD-MCNC: 12 G/DL (ref 12–16)
LYMPHOCYTES NFR BLD: 10.8 % (ref 25–40)
MCH RBC QN AUTO: 32.7 PG (ref 27–31.2)
MCHC RBC AUTO-ENTMCNC: 34.7 G/DL (ref 31.8–35.4)
MCV RBC AUTO: 94.3 FL (ref 80–97)
MONOCYTES NFR BLD: 12.8 % (ref 1–15)
NEUTROPHILS # BLD AUTO: 1.91 10*3/UL (ref 1.8–7.7)
NEUTROPHILS NFR BLD: 66.4 % (ref 37–80)
NUCLEATED RED BLOOD CELLS: 0 %
PLATELETS: 162 10*3/UL (ref 142–424)
POTASSIUM SERPL-SCNC: 4.2 MMOL/L (ref 3.6–5.2)
PROT SERPL-MCNC: 6 G/DL (ref 6.4–8.2)
RBC # BLD AUTO: 3.67 10*6/UL (ref 4.2–5.4)
SODIUM BLD-SCNC: 144 MMOL/L (ref 135–145)
WBC # BLD: 2.9 10*3/UL (ref 4.6–10.2)

## 2025-02-13 ENCOUNTER — OFFICE VISIT (OUTPATIENT)
Dept: HEMATOLOGY/ONCOLOGY | Facility: CLINIC | Age: 65
End: 2025-02-13
Payer: MEDICAID

## 2025-02-13 VITALS
DIASTOLIC BLOOD PRESSURE: 82 MMHG | WEIGHT: 215.5 LBS | RESPIRATION RATE: 16 BRPM | BODY MASS INDEX: 36.79 KG/M2 | TEMPERATURE: 98 F | HEIGHT: 64 IN | SYSTOLIC BLOOD PRESSURE: 129 MMHG | HEART RATE: 58 BPM | OXYGEN SATURATION: 96 %

## 2025-02-13 DIAGNOSIS — C20 RECTAL CANCER: Primary | ICD-10-CM

## 2025-02-13 NOTE — PROGRESS NOTES
ONCOLOGY FOLLOW UP VISIT CONSULTATION    Patient ID: Pernell Dominguez is a 64 y.o. female.  Patient referred by [facility] colorectal surgery, she presented to [facility] November 2020 with 2 year history of intermittent constipation and abdominal cramps. She underwent         Chief Complaint: No chief complaint on file.    Pernell Dominguez is here for [No matching plan found]  So far the patient appears to tolerate chemotherapy fairly well.  Nausea is controlled with the current anti-nausea regimen.  The patient does have mild fatigue.  Today the main concern is     Imaging:   10/2/2024 ct chest abd pelvis  Chronic appearing parenchymal findings in the lungs no acute intrathoracic findings, circumferential wall thickening in the lower rectum extending fro just past sigmoid takeoff distally to the level of the anorectal verge approx 7.9cm length, no liver disease appreciated        History reviewed. No pertinent past medical history.  Family History   Problem Relation Name Age of Onset    Lupus Mother      Stomach cancer Father      Cancer Father      No Known Problems Sister      No Known Problems Brother      Breast cancer Maternal Aunt      Bone cancer Maternal Aunt      Cervical cancer Paternal Grandmother       Social History     Socioeconomic History    Marital status:    Tobacco Use    Smoking status: Never    Smokeless tobacco: Never   Substance and Sexual Activity    Alcohol use: Not Currently    Drug use: Never    Sexual activity: Not Currently     Social Drivers of Health     Financial Resource Strain: Medium Risk (12/11/2024)    Received from [facility]    Overall Financial Resource Strain (CARDIA)     Difficulty of Paying Living Expenses: Somewhat hard   Food Insecurity: No Food Insecurity (12/11/2024)    Received from [facility]    Hunger Vital Sign     Worried About Running Out of Food in the Last Year: Never true     Ran Out of Food in the Last Year: Never true   Transportation Needs: No  Transportation Needs (12/11/2024)    Received from [facility]    PRAPARE - Transportation     Lack of Transportation (Medical): No     Lack of Transportation (Non-Medical): No   Physical Activity: Unknown (12/11/2024)    Received from [facility]    Exercise Vital Sign     Days of Exercise per Week: Patient declined   Stress: Stress Concern Present (12/11/2024)    Received from [facility]    [place] [employer] - Occupational Stress Questionnaire     Feeling of Stress : To some extent   Housing Stability: Unknown (12/11/2024)    Received from [facility]    Housing Stability Vital Sign     Unable to Pay for Housing in the Last Year: No     History reviewed. No pertinent surgical history.        Review of systems:  Review of Systems   Constitutional:  Negative for activity change, appetite change, chills, diaphoresis, fatigue, fever and unexpected weight change.   HENT:  Negative for congestion, dental problem, mouth sores, nosebleeds, sore throat, tinnitus and voice change.    Eyes:  Negative for photophobia, discharge and visual disturbance.   Respiratory:  Negative for apnea, cough, choking, chest tightness, shortness of breath, wheezing and stridor.    Cardiovascular:  Negative for chest pain, palpitations and leg swelling.   Gastrointestinal:  Negative for abdominal distention, abdominal pain, anal bleeding, blood in stool, constipation, diarrhea, nausea, rectal pain and vomiting.   Endocrine: Negative for cold intolerance and heat intolerance.   Genitourinary:  Negative for difficulty urinating, dysuria, hematuria, menstrual problem, vaginal bleeding, vaginal discharge and vaginal pain.   Musculoskeletal:  Negative for arthralgias, back pain, gait problem, joint swelling and myalgias.   Skin:  Negative for color change, pallor, rash and wound.   Neurological:  Negative for dizziness, syncope, light-headedness and numbness.   Hematological:  Negative for adenopathy. Does not bruise/bleed  easily.   Psychiatric/Behavioral:  Negative for agitation, confusion, sleep disturbance and suicidal ideas. The patient is not nervous/anxious.        Objective:     Physical Exam  Constitutional:       Appearance: She is well-developed.   HENT:      Head: Normocephalic.   Eyes:      Conjunctiva/sclera: Conjunctivae normal.      Pupils: Pupils are equal, round, and reactive to light.   Cardiovascular:      Rate and Rhythm: Normal rate and regular rhythm.      Heart sounds: Normal heart sounds.   Pulmonary:      Effort: Pulmonary effort is normal.      Breath sounds: Normal breath sounds.   Chest:      Chest wall: No mass, deformity or tenderness.   Breasts:     Breasts are symmetrical.   Abdominal:      General: Bowel sounds are normal.      Palpations: Abdomen is soft.   Musculoskeletal:         General: Normal range of motion.      Cervical back: Normal range of motion and neck supple.   Skin:     General: Skin is warm and dry.   Neurological:      Mental Status: She is alert and oriented to person, place, and time.   Psychiatric:         Behavior: Behavior normal.         Thought Content: Thought content normal.         Judgment: Judgment normal.       Vitals:    02/13/25 1034   BP: 129/82   Pulse: (!) 58   Resp: 16   Temp: 98 °F (36.7 °C)     Body surface area is 2.1 meters squared.    Labs:  Lab Results   Component Value Date    WBC 2.9 (L) 02/12/2025    HGB 12.0 02/12/2025    HCT 34.6 (L) 02/12/2025    MCV 94.3 02/12/2025    LABPLAT 162 02/12/2025      Platelets   Date Value Ref Range Status   02/12/2025 162 142 - 424 10*3/uL Final     CMP  Sodium   Date Value Ref Range Status   02/12/2025 144 135 - 145 mmol/L Final     Potassium   Date Value Ref Range Status   02/12/2025 4.2 3.6 - 5.2 mmol/L Final     Chloride   Date Value Ref Range Status   02/12/2025 108 100 - 108 mmol/L Final     CO2   Date Value Ref Range Status   02/12/2025 31 21 - 32 mmol/L Final     Glucose   Date Value Ref Range Status   02/12/2025 81  70 - 110 mg/dL Final     BUN   Date Value Ref Range Status   02/12/2025 12 7 - 18 mg/dL Final     Creatinine   Date Value Ref Range Status   02/12/2025 0.76 0.55 - 1.02 mg/dL Final     Calcium   Date Value Ref Range Status   02/12/2025 8.2 (L) 8.8 - 10.5 mg/dL Final     Total Protein   Date Value Ref Range Status   02/12/2025 6.0 (L) 6.4 - 8.2 g/dL Final     Albumin   Date Value Ref Range Status   02/12/2025 3.2 (L) 3.4 - 5.0 g/dL Final     Total Bilirubin   Date Value Ref Range Status   02/12/2025 1.4 (H) 0.0 - 1.0 mg/dL Final     Alkaline Phosphatase   Date Value Ref Range Status   02/12/2025 54 46 - 116 U/L Final     AST   Date Value Ref Range Status   02/12/2025 15 15 - 37 U/L Final     ALT   Date Value Ref Range Status   02/12/2025 19 12 - 78 U/L Final     Anion Gap   Date Value Ref Range Status   02/12/2025 5.0 3.0 - 11.0 mmol/L Final        Assessment/Plan:      ECOG 1    Rectal Cancer Stage IIIA  ([id number])   Patient had consult with Dr. Garcia for XRT recommended XRT with xeloda, will likely need short course XRT+Xeloda followed by CapeOx vs FOLFOX or FOLFIRINOX for 12-16 weeks per NCCN guidelines.  Will send out Xeloda, have patient rtc with Dr. Chaudhary prior to starting with cbc cmp and cea  ==1/6/2025:  Patient here today to discuss upcoming chemotherapy/immunotherapy. An extensive discussion was had which included a thorough discussion of potential side effect profile, risk versus benefits, and expected outcomes.  Risks, including but not limited to, possible hair loss, bone marrow damage, damage to body organs, allergic reactions, sterility, nausea/vomiting, constipation/diarrhea, sores in the mouth, secondary cancers, and rarely death were all discuss.  Specific side effects pertaining to their chemotherapy/immunotherapy medications were discussed as well.  The patient agrees with the plan and all questions and their support systems questions have been answered to their satisfaction.  Premedications were  prescribed and patient was educated on appropriate usage.  Patient was educated on when to call, and given the number to call, or to notify the provider including but not limited to:  Persistent nausea and vomiting, dehydration, fever greater than 100.4 lasting over 1 hour induration or isolated feeders greater than 101, rash while on active chemotherapy or immunotherapy, severe pain or new onset pain not controlled by current pain medication regimen.  Patient was scheduled later this week to see Dr. Chaudhary to discuss treatment planning as initial visit was completed with NP because he was out of the office and work up was ordered, however, visit moved up due to XRT is ready to start.  Dr. Chaudhary was physically present for first part of visit when he discussed Xeloda +XRT, goals of treatment, and plan for Xeloda+XRT followed by surgical evaluation and will need chemotherapy after whether FOLFOX or CapeOx.  Following MD visit chemotherapy education was provided, labs drawn today cbc cmp she will start Xeloda+xrt on 1/7/2025.   ==02/06/25:Tolerating Xeloda with XRT well, will finish on 2/14/2025.  She will need to rtc to discuss with Dr. Chaudhary treatment planning. She has next appt with surgeon in [facility] in April.    == 2/14/25:  Status post completion of Xeloda with XRT.  Discussed with patient in detail future management options and to do additional chemotherapy for 12-16 weeks with CapeOx, after completion of short course chemo XRT.  She voiced understanding.  I will obtain placement and send prior authorization      Plan:  Port placement :  Dr. Adams  Prior authorization for CapeOx for 12-16 weeks   This will be followed by restaging scan and evaluation by surgery      Total time spent in counseling and discussion about further management options including relevant lab work, treatment,  prognosis, medications and intended side effects was more than 60 minutes. More than 50% of the time was spent on counseling and  coordination of care.  This includes face to face time and non-face to face time preparing to see the patient (eg, review of tests), Obtaining and/or reviewing separately obtained history, Documenting clinical information in the electronic or other health record, Independently interpreting resultsand communicating results to the patient/family/caregiver, or Care coordination.

## 2025-02-14 ENCOUNTER — TELEPHONE (OUTPATIENT)
Dept: HEMATOLOGY/ONCOLOGY | Facility: CLINIC | Age: 65
End: 2025-02-14
Payer: MEDICAID

## 2025-02-14 PROBLEM — C20 RECTAL CANCER: Status: ACTIVE | Noted: 2025-02-14

## 2025-02-18 ENCOUNTER — TELEPHONE (OUTPATIENT)
Dept: HEMATOLOGY/ONCOLOGY | Facility: CLINIC | Age: 65
End: 2025-02-18
Payer: MEDICAID

## 2025-02-20 ENCOUNTER — CLINICAL SUPPORT (OUTPATIENT)
Dept: HEMATOLOGY/ONCOLOGY | Facility: CLINIC | Age: 65
End: 2025-02-20
Payer: MEDICAID

## 2025-02-20 VITALS
TEMPERATURE: 98 F | DIASTOLIC BLOOD PRESSURE: 85 MMHG | OXYGEN SATURATION: 99 % | RESPIRATION RATE: 16 BRPM | BODY MASS INDEX: 36.11 KG/M2 | WEIGHT: 211.5 LBS | HEIGHT: 64 IN | SYSTOLIC BLOOD PRESSURE: 130 MMHG | HEART RATE: 65 BPM

## 2025-02-20 DIAGNOSIS — Z71.9 ENCOUNTER FOR EDUCATION: ICD-10-CM

## 2025-02-20 DIAGNOSIS — T45.1X5A CHEMOTHERAPY-INDUCED NAUSEA AND VOMITING: Primary | ICD-10-CM

## 2025-02-20 DIAGNOSIS — R11.2 CHEMOTHERAPY-INDUCED NAUSEA AND VOMITING: Primary | ICD-10-CM

## 2025-02-20 DIAGNOSIS — C20 RECTAL CANCER: ICD-10-CM

## 2025-02-20 RX ORDER — CAPECITABINE 500 MG/1
TABLET, FILM COATED ORAL
Qty: 84 TABLET | Refills: 11 | Status: SHIPPED | OUTPATIENT
Start: 2025-02-20

## 2025-02-20 RX ORDER — CAPECITABINE 150 MG/1
TABLET, FILM COATED ORAL
Qty: 56 TABLET | Refills: 11 | Status: SHIPPED | OUTPATIENT
Start: 2025-02-20

## 2025-02-20 RX ORDER — CAPECITABINE 500 MG/1
TABLET, FILM COATED ORAL
Qty: 90 TABLET | Refills: 0 | Status: CANCELLED | OUTPATIENT
Start: 2025-02-20

## 2025-02-20 NOTE — PROGRESS NOTES
ONCOLOGY FOLLOW UP VISIT CONSULTATION    Patient ID: Pernell Dominguez is a 64 y.o. female.  Patient referred by [facility] colorectal surgery, she presented to [facility] November 2020 with 2 year history of intermittent constipation and abdominal cramps. She underwent         Chief Complaint: No chief complaint on file.    Pernell Dominguez is here for OP COLORECTAL CAPECITABINE OXALIPLATIN      Treatment Goal:   Curative      Status:   Active      Start Date:   2/14/2025 (Planned)      End Date:   5/30/2025 (Planned)      Provider:   Jaylan Chaudhary MD      Chemotherapy:   oxaliplatin (ELOXATIN) 130 mg/m2 = 273 mg in D5W 554.6 mL chemo infusion, 130 mg/m2 = 273 mg, Intravenous, Clinic/HOD 1 time, 0 of 6 cycles    So far the patient appears to tolerate chemotherapy fairly well.  Nausea is controlled with the current anti-nausea regimen.  The patient does have mild fatigue.  Today the main concern is     Imaging:   10/2/2024 ct chest abd pelvis  Chronic appearing parenchymal findings in the lungs no acute intrathoracic findings, circumferential wall thickening in the lower rectum extending fro just past sigmoid takeoff distally to the level of the anorectal verge approx 7.9cm length, no liver disease appreciated        No past medical history on file.  Family History   Problem Relation Name Age of Onset    Lupus Mother      Stomach cancer Father      Cancer Father      No Known Problems Sister      No Known Problems Brother      Breast cancer Maternal Aunt      Bone cancer Maternal Aunt      Cervical cancer Paternal Grandmother       Social History     Socioeconomic History    Marital status:    Tobacco Use    Smoking status: Never    Smokeless tobacco: Never   Substance and Sexual Activity    Alcohol use: Not Currently    Drug use: Never    Sexual activity: Not Currently     Social Drivers of Health     Financial Resource Strain: Medium Risk (12/11/2024)    Received from [facility]    Overall Financial Resource Strain  (CARDIA)     Difficulty of Paying Living Expenses: Somewhat hard   Food Insecurity: No Food Insecurity (12/11/2024)    Received from [facility]    Hunger Vital Sign     Worried About Running Out of Food in the Last Year: Never true     Ran Out of Food in the Last Year: Never true   Transportation Needs: No Transportation Needs (12/11/2024)    Received from [facility]    PRAPARE - Transportation     Lack of Transportation (Medical): No     Lack of Transportation (Non-Medical): No   Physical Activity: Unknown (12/11/2024)    Received from [facility]    Exercise Vital Sign     Days of Exercise per Week: Patient declined   Stress: Stress Concern Present (12/11/2024)    Received from [facility]    [employer] [place] [employer] - Occupational Stress Questionnaire     Feeling of Stress : To some extent   Housing Stability: Unknown (12/11/2024)    Received from [facility]    Housing Stability Vital Sign     Unable to Pay for Housing in the Last Year: No     No past surgical history on file.        Review of systems:  Review of Systems   Constitutional:  Negative for activity change, appetite change, chills, diaphoresis, fatigue, fever and unexpected weight change.   HENT:  Negative for congestion, dental problem, mouth sores, nosebleeds, sore throat, tinnitus and voice change.    Eyes:  Negative for photophobia, discharge and visual disturbance.   Respiratory:  Negative for apnea, cough, choking, chest tightness, shortness of breath, wheezing and stridor.    Cardiovascular:  Negative for chest pain, palpitations and leg swelling.   Gastrointestinal:  Negative for abdominal distention, abdominal pain, anal bleeding, blood in stool, constipation, diarrhea, nausea, rectal pain and vomiting.   Endocrine: Negative for cold intolerance and heat intolerance.   Genitourinary:  Negative for difficulty urinating, dysuria, hematuria, menstrual problem, vaginal bleeding, vaginal discharge and vaginal pain.    Musculoskeletal:  Negative for arthralgias, back pain, gait problem, joint swelling and myalgias.   Skin:  Negative for color change, pallor, rash and wound.   Neurological:  Negative for dizziness, syncope, light-headedness and numbness.   Hematological:  Negative for adenopathy. Does not bruise/bleed easily.   Psychiatric/Behavioral:  Negative for agitation, confusion, sleep disturbance and suicidal ideas. The patient is not nervous/anxious.        Objective:     Physical Exam  Constitutional:       Appearance: She is well-developed.   HENT:      Head: Normocephalic.   Eyes:      Conjunctiva/sclera: Conjunctivae normal.      Pupils: Pupils are equal, round, and reactive to light.   Cardiovascular:      Rate and Rhythm: Normal rate and regular rhythm.      Heart sounds: Normal heart sounds.   Pulmonary:      Effort: Pulmonary effort is normal.      Breath sounds: Normal breath sounds.   Chest:      Chest wall: No mass, deformity or tenderness.   Breasts:     Breasts are symmetrical.   Abdominal:      General: Bowel sounds are normal.      Palpations: Abdomen is soft.   Musculoskeletal:         General: Normal range of motion.      Cervical back: Normal range of motion and neck supple.   Skin:     General: Skin is warm and dry.   Neurological:      Mental Status: She is alert and oriented to person, place, and time.   Psychiatric:         Behavior: Behavior normal.         Thought Content: Thought content normal.         Judgment: Judgment normal.       There were no vitals filed for this visit.    There is no height or weight on file to calculate BSA.    Labs:  Lab Results   Component Value Date    WBC 2.9 (L) 02/12/2025    HGB 12.0 02/12/2025    HCT 34.6 (L) 02/12/2025    MCV 94.3 02/12/2025    LABPLAT 162 02/12/2025      Platelets   Date Value Ref Range Status   02/12/2025 162 142 - 424 10*3/uL Final     CMP  Sodium   Date Value Ref Range Status   02/12/2025 144 135 - 145 mmol/L Final     Potassium   Date Value  Ref Range Status   02/12/2025 4.2 3.6 - 5.2 mmol/L Final     Chloride   Date Value Ref Range Status   02/12/2025 108 100 - 108 mmol/L Final     CO2   Date Value Ref Range Status   02/12/2025 31 21 - 32 mmol/L Final     Glucose   Date Value Ref Range Status   02/12/2025 81 70 - 110 mg/dL Final     BUN   Date Value Ref Range Status   02/12/2025 12 7 - 18 mg/dL Final     Creatinine   Date Value Ref Range Status   02/12/2025 0.76 0.55 - 1.02 mg/dL Final     Calcium   Date Value Ref Range Status   02/12/2025 8.2 (L) 8.8 - 10.5 mg/dL Final     Total Protein   Date Value Ref Range Status   02/12/2025 6.0 (L) 6.4 - 8.2 g/dL Final     Albumin   Date Value Ref Range Status   02/12/2025 3.2 (L) 3.4 - 5.0 g/dL Final     Total Bilirubin   Date Value Ref Range Status   02/12/2025 1.4 (H) 0.0 - 1.0 mg/dL Final     Alkaline Phosphatase   Date Value Ref Range Status   02/12/2025 54 46 - 116 U/L Final     AST   Date Value Ref Range Status   02/12/2025 15 15 - 37 U/L Final     ALT   Date Value Ref Range Status   02/12/2025 19 12 - 78 U/L Final     Anion Gap   Date Value Ref Range Status   02/12/2025 5.0 3.0 - 11.0 mmol/L Final        Assessment/Plan:      ECOG 1    Rectal Cancer Stage IIIA  ([id number])   Patient had consult with Dr. Garcia for XRT recommended XRT with xeloda, will likely need short course XRT+Xeloda followed by CapeOx vs FOLFOX or FOLFIRINOX for 12-16 weeks per NCCN guidelines.  Will send out Xeloda, have patient rtc with Dr. Chaudhary prior to starting with cbc cmp and cea  ==1/6/2025:  Patient here today to discuss upcoming chemotherapy/immunotherapy. An extensive discussion was had which included a thorough discussion of potential side effect profile, risk versus benefits, and expected outcomes.  Risks, including but not limited to, possible hair loss, bone marrow damage, damage to body organs, allergic reactions, sterility, nausea/vomiting, constipation/diarrhea, sores in the mouth, secondary cancers, and rarely death  were all discuss.  Specific side effects pertaining to their chemotherapy/immunotherapy medications were discussed as well.  The patient agrees with the plan and all questions and their support systems questions have been answered to their satisfaction.  Premedications were prescribed and patient was educated on appropriate usage.  Patient was educated on when to call, and given the number to call, or to notify the provider including but not limited to:  Persistent nausea and vomiting, dehydration, fever greater than 100.4 lasting over 1 hour induration or isolated feeders greater than 101, rash while on active chemotherapy or immunotherapy, severe pain or new onset pain not controlled by current pain medication regimen.  Patient was scheduled later this week to see Dr. Chaudhary to discuss treatment planning as initial visit was completed with NP because he was out of the office and work up was ordered, however, visit moved up due to XRT is ready to start.  Dr. Chaudhary was physically present for first part of visit when he discussed Xeloda +XRT, goals of treatment, and plan for Xeloda+XRT followed by surgical evaluation and will need chemotherapy after whether FOLFOX or CapeOx.  Following MD visit chemotherapy education was provided, labs drawn today cbc cmp she will start Xeloda+xrt on 1/7/2025.   ==02/06/25:Tolerating Xeloda with XRT well, will finish on 2/14/2025.  She will need to rtc to discuss with Dr. Chaudhary treatment planning. She has next appt with surgeon in [facility] in April.    == 2/14/25:  Status post completion of Xeloda with XRT.  Discussed with patient in detail future management options and to do additional chemotherapy for 12-16 weeks with CapeOx, after completion of short course chemo XRT.  She voiced understanding.  I will obtain placement and send prior authorization  ==02/20/2025:  Patient here today to discuss upcoming chemotherapy/immunotherapy. An extensive discussion was had which included a  thorough discussion of potential side effect profile, risk versus benefits, and expected outcomes.  Risks, including but not limited to, possible hair loss, bone marrow damage, damage to body organs, allergic reactions, sterility, nausea/vomiting, constipation/diarrhea, sores in the mouth, secondary cancers, and rarely death were all discuss.  Specific side effects pertaining to their chemotherapy/immunotherapy medications were discussed as well.  The patient agrees with the plan and all questions and their support systems questions have been answered to their satisfaction.  Premedications were prescribed and patient was educated on appropriate usage.  Patient was educated on when to call, and given the number to call, or to notify the provider including but not limited to:  Persistent nausea and vomiting, dehydration, fever greater than 100.4 lasting over 1 hour induration or isolated feeders greater than 101, rash while on active chemotherapy or immunotherapy, severe pain or new onset pain not controlled by current pain medication regimen. Patient will have mediport placed on 2/24/2025, plan to start CapeOx on 3/3/2025, she has not received refill of Xeloda, will send out 850mg/m2 po bid days 1-14 every 21 day cycle. Patient is aware will start day of chemotherapy    Days 1-14 every 21 days    Plan:  Rtc 3/3/2025 to start CapeOx for 12-16 weeks   Cbc cmp prior  Refill xeloda, change from M-F dosing with xrt to CapeOx dosing 14 days on and 7 days off  This will be followed by restaging scan and evaluation by surgery      Total time spent in counseling and discussion about further management options including relevant lab work, treatment,  prognosis, medications and intended side effects was more than 60 minutes. More than 50% of the time was spent on counseling and coordination of care.  This includes face to face time and non-face to face time preparing to see the patient (eg, review of tests), Obtaining and/or  reviewing separately obtained history, Documenting clinical information in the electronic or other health record, Independently interpreting resultsand communicating results to the patient/family/caregiver, or Care coordination.

## 2025-02-28 LAB
ALBUMIN SERPL BCP-MCNC: 3.4 G/DL (ref 3.4–5)
ALBUMIN/GLOBULIN RATIO: 1.1 RATIO (ref 1.1–1.8)
ALP SERPL-CCNC: 67 U/L (ref 46–116)
ALT SERPL W P-5'-P-CCNC: 23 U/L (ref 12–78)
ANION GAP SERPL CALC-SCNC: 7 MMOL/L (ref 3–11)
AST SERPL-CCNC: 17 U/L (ref 15–37)
BASOPHILS NFR BLD: 0.6 % (ref 0–3)
BILIRUB SERPL-MCNC: 1.8 MG/DL (ref 0–1)
BUN SERPL-MCNC: 11 MG/DL (ref 7–18)
BUN/CREAT SERPL: 13.58 RATIO (ref 7–18)
CALCIUM SERPL-MCNC: 8.5 MG/DL (ref 8.8–10.5)
CHLORIDE SERPL-SCNC: 105 MMOL/L (ref 100–108)
CO2 SERPL-SCNC: 31 MMOL/L (ref 21–32)
CREAT SERPL-MCNC: 0.81 MG/DL (ref 0.55–1.02)
EOSINOPHIL NFR BLD: 4.4 % (ref 1–3)
ERYTHROCYTE [DISTWIDTH] IN BLOOD BY AUTOMATED COUNT: 17 % (ref 12.5–18)
GFR ESTIMATION: 82
GLOBULIN: 3.1 G/DL (ref 2.3–3.5)
GLUCOSE SERPL-MCNC: 93 MG/DL (ref 70–110)
HCT VFR BLD AUTO: 36.7 % (ref 37–47)
HGB BLD-MCNC: 12.2 G/DL (ref 12–16)
LYMPHOCYTES NFR BLD: 9.4 % (ref 25–40)
MCH RBC QN AUTO: 31.5 PG (ref 27–31.2)
MCHC RBC AUTO-ENTMCNC: 33.2 G/DL (ref 31.8–35.4)
MCV RBC AUTO: 94.8 FL (ref 80–97)
MONOCYTES NFR BLD: 12.8 % (ref 1–15)
NEUTROPHILS # BLD AUTO: 2.61 10*3/UL (ref 1.8–7.7)
NEUTROPHILS NFR BLD: 72.5 % (ref 37–80)
NUCLEATED RED BLOOD CELLS: 0 %
PLATELETS: 172 10*3/UL (ref 142–424)
POTASSIUM SERPL-SCNC: 4 MMOL/L (ref 3.6–5.2)
PROT SERPL-MCNC: 6.5 G/DL (ref 6.4–8.2)
RBC # BLD AUTO: 3.87 10*6/UL (ref 4.2–5.4)
SODIUM BLD-SCNC: 143 MMOL/L (ref 135–145)
WBC # BLD: 3.6 10*3/UL (ref 4.6–10.2)

## 2025-03-03 ENCOUNTER — OFFICE VISIT (OUTPATIENT)
Dept: HEMATOLOGY/ONCOLOGY | Facility: CLINIC | Age: 65
End: 2025-03-03
Payer: MEDICAID

## 2025-03-03 VITALS
RESPIRATION RATE: 16 BRPM | BODY MASS INDEX: 35.71 KG/M2 | DIASTOLIC BLOOD PRESSURE: 104 MMHG | HEART RATE: 68 BPM | OXYGEN SATURATION: 97 % | WEIGHT: 209.19 LBS | HEIGHT: 64 IN | SYSTOLIC BLOOD PRESSURE: 166 MMHG | TEMPERATURE: 98 F

## 2025-03-03 DIAGNOSIS — C20 RECTAL CANCER: Primary | ICD-10-CM

## 2025-03-03 PROCEDURE — 3008F BODY MASS INDEX DOCD: CPT | Mod: CPTII,,, | Performed by: INTERNAL MEDICINE

## 2025-03-03 PROCEDURE — 4010F ACE/ARB THERAPY RXD/TAKEN: CPT | Mod: CPTII,,, | Performed by: INTERNAL MEDICINE

## 2025-03-03 PROCEDURE — 3077F SYST BP >= 140 MM HG: CPT | Mod: CPTII,,, | Performed by: INTERNAL MEDICINE

## 2025-03-03 PROCEDURE — 3080F DIAST BP >= 90 MM HG: CPT | Mod: CPTII,,, | Performed by: INTERNAL MEDICINE

## 2025-03-03 PROCEDURE — 1159F MED LIST DOCD IN RCRD: CPT | Mod: CPTII,,, | Performed by: INTERNAL MEDICINE

## 2025-03-03 PROCEDURE — 99215 OFFICE O/P EST HI 40 MIN: CPT | Mod: S$PBB,,, | Performed by: INTERNAL MEDICINE

## 2025-03-03 PROCEDURE — 1160F RVW MEDS BY RX/DR IN RCRD: CPT | Mod: CPTII,,, | Performed by: INTERNAL MEDICINE

## 2025-03-03 RX ORDER — EPINEPHRINE 0.3 MG/.3ML
0.3 INJECTION SUBCUTANEOUS ONCE AS NEEDED
Status: CANCELLED | OUTPATIENT
Start: 2025-03-03

## 2025-03-03 RX ORDER — HEPARIN 100 UNIT/ML
500 SYRINGE INTRAVENOUS
Status: CANCELLED | OUTPATIENT
Start: 2025-03-03

## 2025-03-03 RX ORDER — SODIUM CHLORIDE 0.9 % (FLUSH) 0.9 %
10 SYRINGE (ML) INJECTION
Status: CANCELLED | OUTPATIENT
Start: 2025-03-03

## 2025-03-03 RX ORDER — DIPHENHYDRAMINE HYDROCHLORIDE 50 MG/ML
50 INJECTION INTRAMUSCULAR; INTRAVENOUS ONCE AS NEEDED
Status: CANCELLED | OUTPATIENT
Start: 2025-03-03

## 2025-03-03 NOTE — PROGRESS NOTES
ONCOLOGY FOLLOW UP VISIT CONSULTATION    Patient ID: Prenell Dominguez is a 64 y.o. female.  Patient referred by [facility] colorectal surgery, she presented to [facility] November 2020 with 2 year history of intermittent constipation and abdominal cramps. She underwent     Chief Complaint: Colon Cancer    Pernell Dominguez is here for OP COLORECTAL CAPECITABINE OXALIPLATIN      Treatment Goal:   Curative      Status:   Active      Start Date:   2/14/2025 (Planned)      End Date:   5/30/2025 (Planned)      Provider:   Jaylan Chaudhary MD      Chemotherapy:   oxaliplatin (ELOXATIN) 130 mg/m2 = 273 mg in D5W 554.6 mL chemo infusion, 130 mg/m2 = 273 mg, Intravenous, Clinic/HOD 1 time, 0 of 6 cycles      Imaging:   10/2/2024 ct chest abd pelvis  Chronic appearing parenchymal findings in the lungs no acute intrathoracic findings, circumferential wall thickening in the lower rectum extending fro just past sigmoid takeoff distally to the level of the anorectal verge approx 7.9cm length, no liver disease appreciated        History reviewed. No pertinent past medical history.  Family History   Problem Relation Name Age of Onset    Lupus Mother      Stomach cancer Father      Cancer Father      No Known Problems Sister      No Known Problems Brother      Breast cancer Maternal Aunt      Bone cancer Maternal Aunt      Cervical cancer Paternal Grandmother       Social History     Socioeconomic History    Marital status:    Tobacco Use    Smoking status: Never    Smokeless tobacco: Never   Substance and Sexual Activity    Alcohol use: Not Currently    Drug use: Never    Sexual activity: Not Currently     Social Drivers of Health     Financial Resource Strain: Medium Risk (12/11/2024)    Received from [facility]    Overall Financial Resource Strain (CARDIA)     Difficulty of Paying Living Expenses: Somewhat hard   Food Insecurity: No Food Insecurity (12/11/2024)    Received from [facility]    Hunger Vital Sign     Worried About Running  Out of Food in the Last Year: Never true     Ran Out of Food in the Last Year: Never true   Transportation Needs: No Transportation Needs (12/11/2024)    Received from [facility]    PRAPARE - Transportation     Lack of Transportation (Medical): No     Lack of Transportation (Non-Medical): No   Physical Activity: Unknown (12/11/2024)    Received from [facility]    Exercise Vital Sign     Days of Exercise per Week: Patient declined   Stress: Stress Concern Present (12/11/2024)    Received from [facility]    [employer] [place] [employer] - Occupational Stress Questionnaire     Feeling of Stress : To some extent   Housing Stability: Unknown (12/11/2024)    Received from [facility]    Housing Stability Vital Sign     Unable to Pay for Housing in the Last Year: No     History reviewed. No pertinent surgical history.        Review of systems:  Review of Systems   Constitutional:  Negative for activity change, appetite change, chills, diaphoresis, fatigue, fever and unexpected weight change.   HENT:  Negative for congestion, dental problem, mouth sores, nosebleeds, sore throat, tinnitus and voice change.    Eyes:  Negative for photophobia, discharge and visual disturbance.   Respiratory:  Negative for apnea, cough, choking, chest tightness, shortness of breath, wheezing and stridor.    Cardiovascular:  Negative for chest pain, palpitations and leg swelling.   Gastrointestinal:  Negative for abdominal distention, abdominal pain, anal bleeding, blood in stool, constipation, diarrhea, nausea, rectal pain and vomiting.   Endocrine: Negative for cold intolerance and heat intolerance.   Genitourinary:  Negative for difficulty urinating, dysuria, hematuria, menstrual problem, vaginal bleeding, vaginal discharge and vaginal pain.   Musculoskeletal:  Negative for arthralgias, back pain, gait problem, joint swelling and myalgias.   Skin:  Negative for color change, pallor, rash and wound.   Neurological:  Negative for  dizziness, syncope, light-headedness and numbness.   Hematological:  Negative for adenopathy. Does not bruise/bleed easily.   Psychiatric/Behavioral:  Negative for agitation, confusion, sleep disturbance and suicidal ideas. The patient is not nervous/anxious.        Objective:     Physical Exam  Constitutional:       Appearance: She is well-developed.   HENT:      Head: Normocephalic.   Eyes:      Conjunctiva/sclera: Conjunctivae normal.      Pupils: Pupils are equal, round, and reactive to light.   Cardiovascular:      Rate and Rhythm: Normal rate and regular rhythm.      Heart sounds: Normal heart sounds.   Pulmonary:      Effort: Pulmonary effort is normal.      Breath sounds: Normal breath sounds.   Chest:      Chest wall: No mass, deformity or tenderness.   Breasts:     Breasts are symmetrical.   Abdominal:      General: Bowel sounds are normal.      Palpations: Abdomen is soft.   Musculoskeletal:         General: Normal range of motion.      Cervical back: Normal range of motion and neck supple.   Skin:     General: Skin is warm and dry.   Neurological:      Mental Status: She is alert and oriented to person, place, and time.   Psychiatric:         Behavior: Behavior normal.         Thought Content: Thought content normal.         Judgment: Judgment normal.       Vitals:    03/03/25 0847   BP: (!) 166/104   Pulse: 68   Resp: 16   Temp: 97.6 °F (36.4 °C)       Body surface area is 2.07 meters squared.    Labs:  Lab Results   Component Value Date    WBC 3.6 (L) 02/28/2025    HGB 12.2 02/28/2025    HCT 36.7 (L) 02/28/2025    MCV 94.8 02/28/2025    LABPLAT 172 02/28/2025      Platelets   Date Value Ref Range Status   02/28/2025 172 142 - 424 10*3/uL Final     CMP  Sodium   Date Value Ref Range Status   02/28/2025 143 135 - 145 mmol/L Final     Potassium   Date Value Ref Range Status   02/28/2025 4.0 3.6 - 5.2 mmol/L Final     Chloride   Date Value Ref Range Status   02/28/2025 105 100 - 108 mmol/L Final     CO2    Date Value Ref Range Status   02/28/2025 31 21 - 32 mmol/L Final     Glucose   Date Value Ref Range Status   02/28/2025 93 70 - 110 mg/dL Final     BUN   Date Value Ref Range Status   02/28/2025 11 7 - 18 mg/dL Final     Creatinine   Date Value Ref Range Status   02/28/2025 0.81 0.55 - 1.02 mg/dL Final     Calcium   Date Value Ref Range Status   02/28/2025 8.5 (L) 8.8 - 10.5 mg/dL Final     Total Protein   Date Value Ref Range Status   02/28/2025 6.5 6.4 - 8.2 g/dL Final     Albumin   Date Value Ref Range Status   02/28/2025 3.4 3.4 - 5.0 g/dL Final     Total Bilirubin   Date Value Ref Range Status   02/28/2025 1.8 (H) 0.0 - 1.0 mg/dL Final     Alkaline Phosphatase   Date Value Ref Range Status   02/28/2025 67 46 - 116 U/L Final     AST   Date Value Ref Range Status   02/28/2025 17 15 - 37 U/L Final     ALT   Date Value Ref Range Status   02/28/2025 23 12 - 78 U/L Final     Anion Gap   Date Value Ref Range Status   02/28/2025 7.0 3.0 - 11.0 mmol/L Final        Assessment/Plan:      ECOG 1    Rectal Cancer Stage IIIA  ([id number])   Patient had consult with Dr. Garcia for XRT recommended XRT with xeloda, will likely need short course XRT+Xeloda followed by CapeOx vs FOLFOX or FOLFIRINOX for 12-16 weeks per NCCN guidelines.  Will send out Xeloda, have patient rtc with Dr. Chaudhary prior to starting with cbc cmp and cea  ==1/6/2025:  Patient here today to discuss upcoming chemotherapy/immunotherapy. An extensive discussion was had which included a thorough discussion of potential side effect profile, risk versus benefits, and expected outcomes.  Risks, including but not limited to, possible hair loss, bone marrow damage, damage to body organs, allergic reactions, sterility, nausea/vomiting, constipation/diarrhea, sores in the mouth, secondary cancers, and rarely death were all discuss.  Specific side effects pertaining to their chemotherapy/immunotherapy medications were discussed as well.  The patient agrees with the plan  and all questions and their support systems questions have been answered to their satisfaction.  Premedications were prescribed and patient was educated on appropriate usage.  Patient was educated on when to call, and given the number to call, or to notify the provider including but not limited to:  Persistent nausea and vomiting, dehydration, fever greater than 100.4 lasting over 1 hour induration or isolated feeders greater than 101, rash while on active chemotherapy or immunotherapy, severe pain or new onset pain not controlled by current pain medication regimen.  Patient was scheduled later this week to see Dr. Chaudhary to discuss treatment planning as initial visit was completed with NP because he was out of the office and work up was ordered, however, visit moved up due to XRT is ready to start.  Dr. Chaudhary was physically present for first part of visit when he discussed Xeloda +XRT, goals of treatment, and plan for Xeloda+XRT followed by surgical evaluation and will need chemotherapy after whether FOLFOX or CapeOx.  Following MD visit chemotherapy education was provided, labs drawn today cbc cmp she will start Xeloda+xrt on 1/7/2025.   ==02/06/25:Tolerating Xeloda with XRT well, will finish on 2/14/2025.  She will need to rtc to discuss with Dr. Chaudhary treatment planning. She has next appt with surgeon in [facility] in April.    == 2/14/25:  Status post completion of Xeloda with XRT.  Discussed with patient in detail future management options and to do additional chemotherapy for 12-16 weeks with CapeOx, after completion of short course chemo XRT.  She voiced understanding.  I will obtain placement and send prior authorization  ==03/3/25: Start CapeOx. Xeloda dose 850mg/m2 po bid days 1-14 every 21 day cycle. Days 1-14 every 21 days. CapeOx for 12-16 weeks. To be followed by restaging scan and evaluation by surgery    Plan:  Start CapeOX    RTC in 3 weeks for NP visit with labs: CBC, CMP for treatment        Total  time spent in counseling and discussion about further management options including relevant lab work, treatment,  prognosis, medications and intended side effects was more than 60 minutes. More than 50% of the time was spent on counseling and coordination of care.  This includes face to face time and non-face to face time preparing to see the patient (eg, review of tests), Obtaining and/or reviewing separately obtained history, Documenting clinical information in the electronic or other health record, Independently interpreting resultsand communicating results to the patient/family/caregiver, or Care coordination.

## 2025-03-20 ENCOUNTER — TELEPHONE (OUTPATIENT)
Dept: HEMATOLOGY/ONCOLOGY | Facility: CLINIC | Age: 65
End: 2025-03-20
Payer: MEDICAID

## 2025-03-20 NOTE — TELEPHONE ENCOUNTER
----- Message from Shaista sent at 3/20/2025  2:58 PM CDT -----  Contact: RUPINDER CHARLTON [[id number]]  ..Type:  Patient Requesting CallWho Called: RUPINDER CHARLTON [[id number]]What is the call regarding?: pt was calling to see if she could reschedule her appt on 3/24 the time to get her port put in. Pt has a  that day. Would the patient rather a call back or a response via [facility]?  [facility] Call Back Number: [phone] (home) Additional Information:

## 2025-03-25 ENCOUNTER — OFFICE VISIT (OUTPATIENT)
Dept: HEMATOLOGY/ONCOLOGY | Facility: CLINIC | Age: 65
End: 2025-03-25
Payer: MEDICAID

## 2025-03-25 VITALS
OXYGEN SATURATION: 96 % | SYSTOLIC BLOOD PRESSURE: 134 MMHG | HEIGHT: 64 IN | WEIGHT: 207 LBS | RESPIRATION RATE: 16 BRPM | HEART RATE: 68 BPM | TEMPERATURE: 98 F | BODY MASS INDEX: 35.34 KG/M2 | DIASTOLIC BLOOD PRESSURE: 79 MMHG

## 2025-03-25 DIAGNOSIS — T45.1X5A CHEMOTHERAPY-INDUCED NAUSEA AND VOMITING: ICD-10-CM

## 2025-03-25 DIAGNOSIS — C20 RECTAL CANCER: Primary | ICD-10-CM

## 2025-03-25 DIAGNOSIS — R11.2 CHEMOTHERAPY-INDUCED NAUSEA AND VOMITING: ICD-10-CM

## 2025-03-25 PROCEDURE — 3078F DIAST BP <80 MM HG: CPT | Mod: CPTII,,, | Performed by: NURSE PRACTITIONER

## 2025-03-25 PROCEDURE — 3075F SYST BP GE 130 - 139MM HG: CPT | Mod: CPTII,,, | Performed by: NURSE PRACTITIONER

## 2025-03-25 PROCEDURE — 1159F MED LIST DOCD IN RCRD: CPT | Mod: CPTII,,, | Performed by: NURSE PRACTITIONER

## 2025-03-25 PROCEDURE — G2211 COMPLEX E/M VISIT ADD ON: HCPCS | Mod: S$PBB,,, | Performed by: NURSE PRACTITIONER

## 2025-03-25 PROCEDURE — 99215 OFFICE O/P EST HI 40 MIN: CPT | Mod: S$PBB,,, | Performed by: NURSE PRACTITIONER

## 2025-03-25 PROCEDURE — 4010F ACE/ARB THERAPY RXD/TAKEN: CPT | Mod: CPTII,,, | Performed by: NURSE PRACTITIONER

## 2025-03-25 PROCEDURE — 3008F BODY MASS INDEX DOCD: CPT | Mod: CPTII,,, | Performed by: NURSE PRACTITIONER

## 2025-03-25 NOTE — PROGRESS NOTES
ONCOLOGY FOLLOW UP VISIT CONSULTATION    Patient ID: Pernell Dominguez is a 64 y.o. female.  Patient referred by [facility] colorectal surgery, she presented to [facility] November 2020 with 2 year history of intermittent constipation and abdominal cramps. She underwent     Chief Complaint: Colon Cancer    Pernell Dominguez is here for OP COLORECTAL CAPECITABINE OXALIPLATIN      Treatment Goal:   Curative      Status:   Active      Start Date:   3/3/2025      End Date:   6/16/2025 (Planned)      Provider:   Jaylan Chaudhary MD      Chemotherapy:   oxaliplatin (ELOXATIN) 130 mg/m2 = 273 mg in D5W 554.6 mL chemo infusion, 130 mg/m2 = 273 mg, Intravenous, Clinic/HOD 1 time, 1 of 6 cycles      Imaging:   10/2/2024 ct chest abd pelvis  Chronic appearing parenchymal findings in the lungs no acute intrathoracic findings, circumferential wall thickening in the lower rectum extending fro just past sigmoid takeoff distally to the level of the anorectal verge approx 7.9cm length, no liver disease appreciated        History reviewed. No pertinent past medical history.  Family History   Problem Relation Name Age of Onset    Lupus Mother      Stomach cancer Father      Cancer Father      No Known Problems Sister      No Known Problems Brother      Breast cancer Maternal Aunt      Bone cancer Maternal Aunt      Cervical cancer Paternal Grandmother       Social History     Socioeconomic History    Marital status:    Tobacco Use    Smoking status: Never    Smokeless tobacco: Never   Substance and Sexual Activity    Alcohol use: Not Currently    Drug use: Never    Sexual activity: Not Currently     Social Drivers of Health     Financial Resource Strain: Medium Risk (12/11/2024)    Received [place] [facility]    Overall Financial Resource Strain (CARDIA)     Difficulty of Paying Living Expenses: Somewhat hard   Food Insecurity: No Food Insecurity (12/11/2024)    [place] [facility]    Hunger Vital Sign     Worried About Running Out of Food  in the Last Year: Never true     Ran Out of Food in the Last Year: Never true   Transportation Needs: No Transportation Needs (12/11/2024)    Received from [facility]    PRAPARE - Transportation     Lack of Transportation (Medical): No     Lack of Transportation (Non-Medical): No   Physical Activity: Unknown (12/11/2024)    Received from [facility]    Exercise Vital Sign     Days of Exercise per Week: Patient declined   Stress: Stress Concern Present (12/11/2024)    Received from [facility]    [employer] - Occupational Stress Questionnaire     Feeling of Stress : To some extent   Housing Stability: Unknown (12/11/2024)    Received from [facility]    Housing Stability Vital Sign     Unable to Pay for Housing in the Last Year: No     History reviewed. No pertinent surgical history.        Review of systems:  Review of Systems   Constitutional:  Negative for activity change, appetite change, chills, diaphoresis, fatigue, fever and unexpected weight change.   HENT:  Negative for congestion, dental problem, mouth sores, nosebleeds, sore throat, tinnitus and voice change.    Eyes:  Negative for photophobia, discharge and visual disturbance.   Respiratory:  Negative for apnea, cough, choking, chest tightness, shortness of breath, wheezing and stridor.    Cardiovascular:  Negative for chest pain, palpitations and leg swelling.   Gastrointestinal:  Negative for abdominal distention, abdominal pain, anal bleeding, blood in stool, constipation, diarrhea, nausea, rectal pain and vomiting.   Endocrine: Negative for cold intolerance and heat intolerance.   Genitourinary:  Negative for difficulty urinating, dysuria, hematuria, menstrual problem, vaginal bleeding, vaginal discharge and vaginal pain.   Musculoskeletal:  Negative for arthralgias, back pain, gait problem, joint swelling and myalgias.   Skin:  Negative for color change, pallor, rash and wound.   Neurological:  Negative for dizziness,  syncope, light-headedness and numbness.   Hematological:  Negative for adenopathy. Does not bruise/bleed easily.   Psychiatric/Behavioral:  Negative for agitation, confusion, sleep disturbance and suicidal ideas. The patient is not nervous/anxious.        Objective:     Physical Exam  Constitutional:       Appearance: She is well-developed.   HENT:      Head: Normocephalic.   Eyes:      Conjunctiva/sclera: Conjunctivae normal.      Pupils: Pupils are equal, round, and reactive to light.   Cardiovascular:      Rate and Rhythm: Normal rate and regular rhythm.      Heart sounds: Normal heart sounds.   Pulmonary:      Effort: Pulmonary effort is normal.      Breath sounds: Normal breath sounds.   Chest:      Chest wall: No mass, deformity or tenderness.   Breasts:     Breasts are symmetrical.   Abdominal:      General: Bowel sounds are normal.      Palpations: Abdomen is soft.   Musculoskeletal:         General: Normal range of motion.      Cervical back: Normal range of motion and neck supple.   Skin:     General: Skin is warm and dry.   Neurological:      Mental Status: She is alert and oriented to person, place, and time.   Psychiatric:         Behavior: Behavior normal.         Thought Content: Thought content normal.         Judgment: Judgment normal.       Vitals:    03/25/25 0852   BP: 134/79   Resp: 16   Temp: (!) 63 °F (17.2 °C)       Body surface area is 2.06 meters squared.    Labs:  Lab Results   Component Value Date    WBC 2.31 (L) 03/21/2025    HGB 11.2 (L) 03/21/2025    HCT 32.0 (L) 03/21/2025    MCV 95.2 03/21/2025    LABPLAT 172 02/28/2025      Platelets   Date Value Ref Range Status   02/28/2025 172 142 - 424 10*3/uL Final     CMP  Sodium   Date Value Ref Range Status   03/21/2025 142 136 - 145 mmol/L Final     Potassium   Date Value Ref Range Status   03/21/2025 3.4 (L) 3.5 - 5.1 mmol/L Final     Chloride   Date Value Ref Range Status   03/21/2025 107 98 - 107 mmol/L Final     CO2   Date Value Ref  Range Status   03/21/2025 28 22 - 29 mmol/L Final     Glucose   Date Value Ref Range Status   02/28/2025 93 70 - 110 mg/dL Final     BUN   Date Value Ref Range Status   03/21/2025 8.6 8 - 23 mg/dL Final     Creatinine   Date Value Ref Range Status   03/21/2025 0.79 0.50 - 0.90 mg/dL Final     Calcium   Date Value Ref Range Status   03/21/2025 8.4 (L) 8.6 - 10.2 mg/dL Final     Total Protein   Date Value Ref Range Status   02/28/2025 6.5 6.4 - 8.2 g/dL Final     Albumin   Date Value Ref Range Status   03/21/2025 3.9 3.5 - 5.2 g/dL Final     Total Bilirubin   Date Value Ref Range Status   02/28/2025 1.8 (H) 0.0 - 1.0 mg/dL Final     Alkaline Phosphatase   Date Value Ref Range Status   02/28/2025 67 46 - 116 U/L Final     AST   Date Value Ref Range Status   03/21/2025 20 0 - 32 U/L Final     ALT   Date Value Ref Range Status   02/28/2025 23 12 - 78 U/L Final     Anion Gap   Date Value Ref Range Status   03/21/2025 7.0 (L) 8.0 - 17.0 mmol/L Final     Comment:     NOTE  Testing performed at:  [facility], [address] [place] [address] CLIA #:[id number]          Assessment/Plan:      ECOG 1    Rectal Cancer Stage IIIA  ([id number])   Patient had consult with Dr. Garcia for XRT recommended XRT with xeloda, will likely need short course XRT+Xeloda followed by CapeOx vs FOLFOX or FOLFIRINOX for 12-16 weeks per NCCN guidelines.  Will send out Xeloda, have patient rtc with Dr. Chaudhary prior to starting with cbc cmp and cea  ==1/6/2025:  Patient here today to discuss upcoming chemotherapy/immunotherapy. An extensive discussion was had which included a thorough discussion of potential side effect profile, risk versus benefits, and expected outcomes.  Risks, including but not limited to, possible hair loss, bone marrow damage, damage to body organs, allergic reactions, sterility, nausea/vomiting, constipation/diarrhea, sores in the mouth, secondary cancers, and rarely death were all discuss.  Specific side  effects pertaining to their chemotherapy/immunotherapy medications were discussed as well.  The patient agrees with the plan and all questions and their support systems questions have been answered to their satisfaction.  Premedications were prescribed and patient was educated on appropriate usage.  Patient was educated on when to call, and given the number to call, or to notify the provider including but not limited to:  Persistent nausea and vomiting, dehydration, fever greater than 100.4 lasting over 1 hour induration or isolated feeders greater than 101, rash while on active chemotherapy or immunotherapy, severe pain or new onset pain not controlled by current pain medication regimen.  Patient was scheduled later this week to see Dr. Chaudhary to discuss treatment planning as initial visit was completed with NP because he was out of the office and work up was ordered, however, visit moved up due to XRT is ready to start.  Dr. Chaudhary was physically present for first part of visit when he discussed Xeloda +XRT, goals of treatment, and plan for Xeloda+XRT followed by surgical evaluation and will need chemotherapy after whether FOLFOX or CapeOx.  Following MD visit chemotherapy education was provided, labs drawn today cbc cmp she will start Xeloda+xrt on 1/7/2025.   ==02/06/25:Tolerating Xeloda with XRT well, will finish on 2/14/2025.  She will need to rtc to discuss with Dr. Chaudhary treatment planning. She has next appt with surgeon in [facility] in April.    == 2/14/25:  Status post completion of Xeloda with XRT.  Discussed with patient in detail future management options and to do additional chemotherapy for 12-16 weeks with CapeOx, after completion of short course chemo XRT.  She voiced understanding.  I will obtain placement and send prior authorization  ==03/3/25: Start CapeOx. Xeloda dose 850mg/m2 po bid days 1-14 every 21 day cycle. Days 1-14 every 21 days. CapeOx for 12-16 weeks. To be followed by restaging scan and  "evaluation by surgery  ==03/25/2025: to clinic for cycle 2 of CapeOx, anc 1340.  Patient reports after first infusion of Oxaliplatin patient had dizziness, feeling "drunk" ataxia and cramping/clubbing of hands. Patient had PRES from oxaliplatin, discussed with Dr. Chaudhary who reviewed NCCN guidelines and recommended changing from capeox to FOLFIRI/XELERI - will change to XELERI due to patient living an hour away and difficulty with returning for pump d/c.  Will also start pt on po calcium due to mild hypocalcemia, calcium 8.4 and check mg level as low calcium and mag can be associated with PRES      Plan:  Next visit chemo ed appt cbc cmp mg prior  D/c CapeOX  Send XELFIRI for approval   RTC in 1 weeks for NP visit with labs: CBC, CMP for treatment        Total time spent in counseling and discussion about further management options including relevant lab work, treatment,  prognosis, medications and intended side effects was more than 60 minutes. More than 50% of the time was spent on counseling and coordination of care.  This includes face to face time and non-face to face time preparing to see the patient (eg, review of tests), Obtaining and/or reviewing separately obtained history, Documenting clinical information in the electronic or other health record, Independently interpreting resultsand communicating results to the patient/family/caregiver, or Care coordination.                 "

## 2025-04-01 ENCOUNTER — CLINICAL SUPPORT (OUTPATIENT)
Dept: HEMATOLOGY/ONCOLOGY | Facility: CLINIC | Age: 65
End: 2025-04-01
Payer: MEDICAID

## 2025-04-01 VITALS
DIASTOLIC BLOOD PRESSURE: 89 MMHG | HEIGHT: 64 IN | BODY MASS INDEX: 34.81 KG/M2 | TEMPERATURE: 98 F | HEART RATE: 65 BPM | SYSTOLIC BLOOD PRESSURE: 139 MMHG | WEIGHT: 203.88 LBS | RESPIRATION RATE: 16 BRPM | OXYGEN SATURATION: 99 %

## 2025-04-01 DIAGNOSIS — Z71.9 ENCOUNTER FOR EDUCATION: ICD-10-CM

## 2025-04-01 DIAGNOSIS — C20 RECTAL CANCER: Primary | ICD-10-CM

## 2025-04-01 PROCEDURE — 99215 OFFICE O/P EST HI 40 MIN: CPT | Mod: S$PBB,,, | Performed by: NURSE PRACTITIONER

## 2025-04-01 PROCEDURE — G2211 COMPLEX E/M VISIT ADD ON: HCPCS | Mod: S$PBB,,, | Performed by: NURSE PRACTITIONER

## 2025-04-01 RX ORDER — PROCHLORPERAZINE EDISYLATE 5 MG/ML
10 INJECTION INTRAMUSCULAR; INTRAVENOUS ONCE AS NEEDED
Status: CANCELLED | OUTPATIENT
Start: 2025-04-01

## 2025-04-01 RX ORDER — HEPARIN 100 UNIT/ML
500 SYRINGE INTRAVENOUS
Status: CANCELLED | OUTPATIENT
Start: 2025-04-01

## 2025-04-01 RX ORDER — ATROPINE SULFATE 0.4 MG/ML
0.4 INJECTION, SOLUTION ENDOTRACHEAL; INTRAMEDULLARY; INTRAMUSCULAR; INTRAVENOUS; SUBCUTANEOUS ONCE AS NEEDED
Status: CANCELLED | OUTPATIENT
Start: 2025-04-01

## 2025-04-01 RX ORDER — DIPHENHYDRAMINE HYDROCHLORIDE 50 MG/ML
50 INJECTION, SOLUTION INTRAMUSCULAR; INTRAVENOUS ONCE AS NEEDED
Status: CANCELLED | OUTPATIENT
Start: 2025-04-01

## 2025-04-01 RX ORDER — SODIUM CHLORIDE 0.9 % (FLUSH) 0.9 %
10 SYRINGE (ML) INJECTION
Status: CANCELLED | OUTPATIENT
Start: 2025-04-01

## 2025-04-01 RX ORDER — EPINEPHRINE 0.3 MG/.3ML
0.3 INJECTION SUBCUTANEOUS ONCE AS NEEDED
Status: CANCELLED | OUTPATIENT
Start: 2025-04-01

## 2025-04-01 NOTE — PROGRESS NOTES
ONCOLOGY FOLLOW UP VISIT CONSULTATION    Patient ID: Pernell Dominguez is a 64 y.o. female.  Patient referred by [facility] colorectal surgery, she presented to [facility] November 2020 with 2 year history of intermittent constipation and abdominal cramps. She underwent     Chief Complaint: Colon Cancer    Pernell Dominguez is here for OP GI Irinotecan Q3W (XELIRI - with xeloda home medication)      Treatment Goal:   Curative      Status:   Active      Start Date:   4/1/2025 (Planned)      End Date:   3/3/2026 (Planned)      Provider:   Amisha Barlow NP      Chemotherapy:   irinotecan (CAMPTOSAR) 350 mg/m2 = 722 mg in 0.9% NaCl 536.1 mL chemo infusion, 350 mg/m2 = 722 mg, Intravenous, Clinic/HOD 1 time, 0 of 17 cycles      Imaging:   10/2/2024 ct chest abd pelvis  Chronic appearing parenchymal findings in the lungs no acute intrathoracic findings, circumferential wall thickening in the lower rectum extending fro just past sigmoid takeoff distally to the level of the anorectal verge approx 7.9cm length, no liver disease appreciated        History reviewed. No pertinent past medical history.  Family History   Problem Relation Name Age of Onset    Lupus Mother      Stomach cancer Father      Cancer Father      No Known Problems Sister      No Known Problems Brother      Breast cancer Maternal Aunt      Bone cancer Maternal Aunt      Cervical cancer Paternal Grandmother       Social History     Socioeconomic History    Marital status:    Tobacco Use    Smoking status: Never    Smokeless tobacco: Never   Substance and Sexual Activity    Alcohol use: Not Currently    Drug use: Never    Sexual activity: Not Currently     Social Drivers of Health     Financial Resource Strain: Medium Risk (12/11/2024)    [place] [facility]    Overall Financial Resource Strain (CARDIA)     Difficulty of Paying Living Expenses: Somewhat hard   Food Insecurity: No Food Insecurity (12/11/2024)    [place] [facility]    Hunger Vital  Sign     Worried About Running Out of Food in the Last Year: Never true     Ran Out of Food in the Last Year: Never true   Transportation Needs: No Transportation Needs (12/11/2024)    Received from [facility]    PRAPARE - Transportation     Lack of Transportation (Medical): No     Lack of Transportation (Non-Medical): No   Physical Activity: Unknown (12/11/2024)    Received from [facility]    Exercise Vital Sign     Days of Exercise per Week: Patient declined   Stress: Stress Concern Present (12/11/2024)    Received from [facility]    [employer] - Occupational Stress Questionnaire     Feeling of Stress : To some extent   Housing Stability: Unknown (12/11/2024)    Received from [facility]    Housing Stability Vital Sign     Unable to Pay for Housing in the Last Year: No     History reviewed. No pertinent surgical history.        Review of systems:  Review of Systems   Constitutional:  Negative for activity change, appetite change, chills, diaphoresis, fatigue, fever and unexpected weight change.   HENT:  Negative for congestion, dental problem, mouth sores, nosebleeds, sore throat, tinnitus and voice change.    Eyes:  Negative for photophobia, discharge and visual disturbance.   Respiratory:  Negative for apnea, cough, choking, chest tightness, shortness of breath, wheezing and stridor.    Cardiovascular:  Negative for chest pain, palpitations and leg swelling.   Gastrointestinal:  Negative for abdominal distention, abdominal pain, anal bleeding, blood in stool, constipation, diarrhea, nausea, rectal pain and vomiting.   Endocrine: Negative for cold intolerance and heat intolerance.   Genitourinary:  Negative for difficulty urinating, dysuria, hematuria, menstrual problem, vaginal bleeding, vaginal discharge and vaginal pain.   Musculoskeletal:  Negative for arthralgias, back pain, gait problem, joint swelling and myalgias.   Skin:  Negative for color change, pallor, rash and wound.    Neurological:  Negative for dizziness, syncope, light-headedness and numbness.   Hematological:  Negative for adenopathy. Does not bruise/bleed easily.   Psychiatric/Behavioral:  Negative for agitation, confusion, sleep disturbance and suicidal ideas. The patient is not nervous/anxious.        Objective:     Physical Exam  Constitutional:       Appearance: She is well-developed.   HENT:      Head: Normocephalic.   Eyes:      Conjunctiva/sclera: Conjunctivae normal.      Pupils: Pupils are equal, round, and reactive to light.   Cardiovascular:      Rate and Rhythm: Normal rate and regular rhythm.      Heart sounds: Normal heart sounds.   Pulmonary:      Effort: Pulmonary effort is normal.      Breath sounds: Normal breath sounds.   Chest:      Chest wall: No mass, deformity or tenderness.   Breasts:     Breasts are symmetrical.   Abdominal:      General: Bowel sounds are normal.      Palpations: Abdomen is soft.   Musculoskeletal:         General: Normal range of motion.      Cervical back: Normal range of motion and neck supple.   Skin:     General: Skin is warm and dry.   Neurological:      Mental Status: She is alert and oriented to person, place, and time.   Psychiatric:         Behavior: Behavior normal.         Thought Content: Thought content normal.         Judgment: Judgment normal.       Vitals:    04/01/25 1102   BP: 139/89   Pulse: 65   Resp: 16   Temp: 98.1 °F (36.7 °C)       Body surface area is 2.04 meters squared.    Labs:  Lab Results   Component Value Date    WBC 3.92 (L) 03/31/2025    HGB 12.0 03/31/2025    HCT 36.0 (L) 03/31/2025    MCV 98.6 03/31/2025    LABPLAT 172 02/28/2025      Platelets   Date Value Ref Range Status   02/28/2025 172 142 - 424 10*3/uL Final     CMP  Sodium   Date Value Ref Range Status   03/31/2025 141 136 - 145 mmol/L Final     Potassium   Date Value Ref Range Status   03/31/2025 4.3 3.5 - 5.1 mmol/L Final     Chloride   Date Value Ref Range Status   03/31/2025 103 98 -  107 mmol/L Final     CO2   Date Value Ref Range Status   03/31/2025 26 22 - 29 mmol/L Final     Glucose   Date Value Ref Range Status   02/28/2025 93 70 - 110 mg/dL Final     BUN   Date Value Ref Range Status   03/31/2025 10.0 8 - 23 mg/dL Final     Creatinine   Date Value Ref Range Status   03/31/2025 0.90 0.50 - 0.90 mg/dL Final     Calcium   Date Value Ref Range Status   03/31/2025 9.0 8.6 - 10.2 mg/dL Final     Total Protein   Date Value Ref Range Status   02/28/2025 6.5 6.4 - 8.2 g/dL Final     Albumin   Date Value Ref Range Status   03/31/2025 4.0 3.5 - 5.2 g/dL Final     Total Bilirubin   Date Value Ref Range Status   02/28/2025 1.8 (H) 0.0 - 1.0 mg/dL Final     Alkaline Phosphatase   Date Value Ref Range Status   02/28/2025 67 46 - 116 U/L Final     AST   Date Value Ref Range Status   03/31/2025 20 0 - 32 U/L Final     ALT   Date Value Ref Range Status   02/28/2025 23 12 - 78 U/L Final     Anion Gap   Date Value Ref Range Status   03/31/2025 12.0 8.0 - 17.0 mmol/L Final     Comment:     NOTE  Testing performed at:  [facility], [address] [place] [address] CLIA #:[id number]          Assessment/Plan:      ECOG 1    Rectal Cancer Stage IIIA  ([id number])   Patient had consult with Dr. Garcia for XRT recommended XRT with xeloda, will likely need short course XRT+Xeloda followed by CapeOx vs FOLFOX or FOLFIRINOX for 12-16 weeks per NCCN guidelines.  Will send out Xeloda, have patient rtc with Dr. Chaudhary prior to starting with cbc cmp and cea  ==1/6/2025:  Patient here today to discuss upcoming chemotherapy/immunotherapy. An extensive discussion was had which included a thorough discussion of potential side effect profile, risk versus benefits, and expected outcomes.  Risks, including but not limited to, possible hair loss, bone marrow damage, damage to body organs, allergic reactions, sterility, nausea/vomiting, constipation/diarrhea, sores in the mouth, secondary cancers, and rarely death  were all discuss.  Specific side effects pertaining to their chemotherapy/immunotherapy medications were discussed as well.  The patient agrees with the plan and all questions and their support systems questions have been answered to their satisfaction.  Premedications were prescribed and patient was educated on appropriate usage.  Patient was educated on when to call, and given the number to call, or to notify the provider including but not limited to:  Persistent nausea and vomiting, dehydration, fever greater than 100.4 lasting over 1 hour induration or isolated feeders greater than 101, rash while on active chemotherapy or immunotherapy, severe pain or new onset pain not controlled by current pain medication regimen.  Patient was scheduled later this week to see Dr. Chaudhary to discuss treatment planning as initial visit was completed with NP because he was out of the office and work up was ordered, however, visit moved up due to XRT is ready to start.  Dr. Chaudhary was physically present for first part of visit when he discussed Xeloda +XRT, goals of treatment, and plan for Xeloda+XRT followed by surgical evaluation and will need chemotherapy after whether FOLFOX or CapeOx.  Following MD visit chemotherapy education was provided, labs drawn today cbc cmp she will start Xeloda+xrt on 1/7/2025.   ==02/06/25:Tolerating Xeloda with XRT well, will finish on 2/14/2025.  She will need to rtc to discuss with Dr. Chaudhary treatment planning. She has next appt with surgeon in [facility] in April.    == 2/14/25:  Status post completion of Xeloda with XRT.  Discussed with patient in detail future management options and to do additional chemotherapy for 12-16 weeks with CapeOx, after completion of short course chemo XRT.  She voiced understanding.  I will obtain placement and send prior authorization  ==03/3/25: Start CapeOx. Xeloda dose 850mg/m2 po bid days 1-14 every 21 day cycle. Days 1-14 every 21 days. CapeOx for 12-16 weeks. To  "be followed by restaging scan and evaluation by surgery  ==03/25/2025: to clinic for cycle 2 of CapeOx, anc 1340.  Patient reports after first infusion of Oxaliplatin patient had dizziness, feeling "drunk" ataxia and cramping/clubbing of hands. Patient had PRES from oxaliplatin, discussed with Dr. Chaudhary who reviewed NCCN guidelines and recommended changing from capeox to FOLFIRI/XELERI - will change to XELERI due to patient living an hour away and difficulty with returning for pump d/c.  Will also start pt on po calcium due to mild hypocalcemia, calcium 8.4 and check mg level as low calcium and mag can be associated with PRES  ==04/01/2025:  Patient here today to discuss upcoming chemotherapy/immunotherapy. An extensive discussion was had which included a thorough discussion of potential side effect profile, risk versus benefits, and expected outcomes.  Risks, including but not limited to, possible hair loss, bone marrow damage, damage to body organs, allergic reactions, sterility, nausea/vomiting, constipation/diarrhea, sores in the mouth, secondary cancers, and rarely death were all discuss.  Specific side effects pertaining to their chemotherapy/immunotherapy medications were discussed as well.  The patient agrees with the plan and all questions and their support systems questions have been answered to their satisfaction.  Premedications were prescribed and patient was educated on appropriate usage.  Patient was educated on when to call, and given the number to call, or to notify the provider including but not limited to:  Persistent nausea and vomiting, dehydration, fever greater than 100.4 lasting over 1 hour induration or isolated feeders greater than 101, rash while on active chemotherapy or immunotherapy, severe pain or new onset pain not controlled by current pain medication regimen. Patient will start Irinotecan, had severe delayed reaction (PRES) after oxaliplatin, has appt at [facility] next " week.    Plan:  Next visit chemo ed appt cbc cmp mg prior  D/c CapeOX  start XELFIRI   RTC in 3 weeks for NP visit with labs: CBC, CMP for treatment        Total time spent in counseling and discussion about further management options including relevant lab work, treatment,  prognosis, medications and intended side effects was more than 60 minutes. More than 50% of the time was spent on counseling and coordination of care.  This includes face to face time and non-face to face time preparing to see the patient (eg, review of tests), Obtaining and/or reviewing separately obtained history, Documenting clinical information in the electronic or other health record, Independently interpreting resultsand communicating results to the patient/family/caregiver, or Care coordination.

## 2025-04-22 ENCOUNTER — OFFICE VISIT (OUTPATIENT)
Dept: HEMATOLOGY/ONCOLOGY | Facility: CLINIC | Age: 65
End: 2025-04-22
Payer: MEDICAID

## 2025-04-22 VITALS
SYSTOLIC BLOOD PRESSURE: 114 MMHG | WEIGHT: 204.5 LBS | TEMPERATURE: 98 F | BODY MASS INDEX: 34.91 KG/M2 | RESPIRATION RATE: 16 BRPM | OXYGEN SATURATION: 94 % | HEIGHT: 64 IN | HEART RATE: 60 BPM | DIASTOLIC BLOOD PRESSURE: 75 MMHG

## 2025-04-22 DIAGNOSIS — C20 RECTAL CANCER: Primary | ICD-10-CM

## 2025-04-22 DIAGNOSIS — D50.0 ANEMIA DUE TO CHRONIC BLOOD LOSS: ICD-10-CM

## 2025-04-22 LAB
FERRITIN: 432 NG/ML (ref 20–288)
IRON BINDING CAPACITY: 282 UG/DL (ref 262–472)
IRON SERPL-MCNC: 92 UG/DL (ref 37–145)
UIBC SERPL-MCNC: 190 UG/DL (ref 112–306)

## 2025-04-22 PROCEDURE — 3008F BODY MASS INDEX DOCD: CPT | Mod: CPTII,,, | Performed by: NURSE PRACTITIONER

## 2025-04-22 PROCEDURE — 4010F ACE/ARB THERAPY RXD/TAKEN: CPT | Mod: CPTII,,, | Performed by: NURSE PRACTITIONER

## 2025-04-22 PROCEDURE — 3074F SYST BP LT 130 MM HG: CPT | Mod: CPTII,,, | Performed by: NURSE PRACTITIONER

## 2025-04-22 PROCEDURE — 99215 OFFICE O/P EST HI 40 MIN: CPT | Mod: S$PBB,,, | Performed by: NURSE PRACTITIONER

## 2025-04-22 PROCEDURE — G2211 COMPLEX E/M VISIT ADD ON: HCPCS | Mod: S$PBB,,, | Performed by: NURSE PRACTITIONER

## 2025-04-22 PROCEDURE — 1159F MED LIST DOCD IN RCRD: CPT | Mod: CPTII,,, | Performed by: NURSE PRACTITIONER

## 2025-04-22 PROCEDURE — 3078F DIAST BP <80 MM HG: CPT | Mod: CPTII,,, | Performed by: NURSE PRACTITIONER

## 2025-04-22 RX ORDER — EPINEPHRINE 0.3 MG/.3ML
0.3 INJECTION SUBCUTANEOUS ONCE AS NEEDED
Status: CANCELLED | OUTPATIENT
Start: 2025-04-22

## 2025-04-22 RX ORDER — HEPARIN 100 UNIT/ML
500 SYRINGE INTRAVENOUS
Status: CANCELLED | OUTPATIENT
Start: 2025-04-22

## 2025-04-22 RX ORDER — ATROPINE SULFATE 0.4 MG/ML
0.4 INJECTION, SOLUTION ENDOTRACHEAL; INTRAMEDULLARY; INTRAMUSCULAR; INTRAVENOUS; SUBCUTANEOUS ONCE AS NEEDED
Status: CANCELLED | OUTPATIENT
Start: 2025-04-22

## 2025-04-22 RX ORDER — PROCHLORPERAZINE MALEATE 10 MG
10 TABLET ORAL EVERY 8 HOURS PRN
Qty: 60 TABLET | Refills: 3 | Status: SHIPPED | OUTPATIENT
Start: 2025-04-22

## 2025-04-22 RX ORDER — PROCHLORPERAZINE EDISYLATE 5 MG/ML
10 INJECTION INTRAMUSCULAR; INTRAVENOUS ONCE AS NEEDED
Status: CANCELLED | OUTPATIENT
Start: 2025-04-22

## 2025-04-22 RX ORDER — SODIUM CHLORIDE 0.9 % (FLUSH) 0.9 %
10 SYRINGE (ML) INJECTION
Status: CANCELLED | OUTPATIENT
Start: 2025-04-22

## 2025-04-22 RX ORDER — DIPHENHYDRAMINE HYDROCHLORIDE 50 MG/ML
50 INJECTION, SOLUTION INTRAMUSCULAR; INTRAVENOUS ONCE AS NEEDED
Status: CANCELLED | OUTPATIENT
Start: 2025-04-22

## 2025-04-22 NOTE — PROGRESS NOTES
ONCOLOGY FOLLOW UP VISIT CONSULTATION    Patient ID: Pernell Dominguez is a 64 y.o. female.  Patient referred by [facility] colorectal surgery, she presented to [facility] November 2020 with 2 year history of intermittent constipation and abdominal cramps. She underwent     Chief Complaint: Colon Cancer    Pernell Dominguez is here for OP GI Irinotecan Q3W (XELIRI - with xeloda home medication)      Treatment Goal:   Curative      Status:   Active      Start Date:   4/1/2025      End Date:   3/3/2026 (Planned)      Provider:   Amisha Barlow NP      Chemotherapy:   irinotecan (CAMPTOSAR) 350 mg/m2 = 722 mg in 0.9% NaCl 536.1 mL chemo infusion, 350 mg/m2 = 722 mg, Intravenous, Clinic/HOD 1 time, 1 of 17 cycles      Imaging:   10/2/2024 ct chest abd pelvis  Chronic appearing parenchymal findings in the lungs no acute intrathoracic findings, circumferential wall thickening in the lower rectum extending fro just past sigmoid takeoff distally to the level of the anorectal verge approx 7.9cm length, no liver disease appreciated        History reviewed. No pertinent past medical history.  Family History   Problem Relation Name Age of Onset    Lupus Mother      Stomach cancer Father      Cancer Father      No Known Problems Sister      No Known Problems Brother      Breast cancer Maternal Aunt      Bone cancer Maternal Aunt      Cervical cancer Paternal Grandmother       Social History     Socioeconomic History    Marital status:    Tobacco Use    Smoking status: Never    Smokeless tobacco: Never   Substance and Sexual Activity    Alcohol use: Not Currently    Drug use: Never    Sexual activity: Not Currently     Social Drivers of Health     Financial Resource Strain: Medium Risk (12/11/2024)    Received from [facility]    Overall Financial Resource Strain (CARDIA)     Difficulty of Paying Living Expenses: Somewhat hard   Food Insecurity: No Food Insecurity (12/11/2024)    [place] [facility]    Hunger Vital Sign      Worried About Running Out of Food in the Last Year: Never true     Ran Out of Food in the Last Year: Never true   Transportation Needs: No Transportation Needs (12/11/2024)    Received from [facility]    PRAPARE - Transportation     Lack of Transportation (Medical): No     Lack of Transportation (Non-Medical): No   Physical Activity: Unknown (12/11/2024)    Received from [facility]    Exercise Vital Sign     Days of Exercise per Week: Patient declined   Stress: Stress Concern Present (12/11/2024)    Received from [facility]    [employer] - Occupational Stress Questionnaire     Feeling of Stress : To some extent   Housing Stability: Unknown (12/11/2024)    Received from [facility]    Housing Stability Vital Sign     Unable to Pay for Housing in the Last Year: No     History reviewed. No pertinent surgical history.        Review of systems:  Review of Systems   Constitutional:  Negative for activity change, appetite change, chills, diaphoresis, fatigue, fever and unexpected weight change.   HENT:  Negative for congestion, dental problem, mouth sores, nosebleeds, sore throat, tinnitus and voice change.    Eyes:  Negative for photophobia, discharge and visual disturbance.   Respiratory:  Negative for apnea, cough, choking, chest tightness, shortness of breath, wheezing and stridor.    Cardiovascular:  Negative for chest pain, palpitations and leg swelling.   Gastrointestinal:  Negative for abdominal distention, abdominal pain, anal bleeding, blood in stool, constipation, diarrhea, nausea, rectal pain and vomiting.   Endocrine: Negative for cold intolerance and heat intolerance.   Genitourinary:  Negative for difficulty urinating, dysuria, hematuria, menstrual problem, vaginal bleeding, vaginal discharge and vaginal pain.   Musculoskeletal:  Negative for arthralgias, back pain, gait problem, joint swelling and myalgias.   Skin:  Negative for color change, pallor, rash and wound.    Neurological:  Negative for dizziness, syncope, light-headedness and numbness.   Hematological:  Negative for adenopathy. Does not bruise/bleed easily.   Psychiatric/Behavioral:  Negative for agitation, confusion, sleep disturbance and suicidal ideas. The patient is not nervous/anxious.        Objective:     Physical Exam  Constitutional:       Appearance: She is well-developed.   HENT:      Head: Normocephalic.   Eyes:      Conjunctiva/sclera: Conjunctivae normal.      Pupils: Pupils are equal, round, and reactive to light.   Cardiovascular:      Rate and Rhythm: Normal rate and regular rhythm.      Heart sounds: Normal heart sounds.   Pulmonary:      Effort: Pulmonary effort is normal.      Breath sounds: Normal breath sounds.   Chest:      Chest wall: No mass, deformity or tenderness.   Breasts:     Breasts are symmetrical.   Abdominal:      General: Bowel sounds are normal.      Palpations: Abdomen is soft.   Musculoskeletal:         General: Normal range of motion.      Cervical back: Normal range of motion and neck supple.   Skin:     General: Skin is warm and dry.   Neurological:      Mental Status: She is alert and oriented to person, place, and time.   Psychiatric:         Behavior: Behavior normal.         Thought Content: Thought content normal.         Judgment: Judgment normal.       Vitals:    04/22/25 1037   BP: 114/75   Pulse: 60   Resp: 16   Temp: 97.9 °F (36.6 °C)       Body surface area is 2.05 meters squared.    Labs:  Lab Results   Component Value Date    WBC 3.01 (L) 04/21/2025    HGB 9.4 (L) 04/21/2025    HCT 27.7 (L) 04/21/2025    .0 04/21/2025    LABPLAT 172 02/28/2025      Platelets   Date Value Ref Range Status   02/28/2025 172 142 - 424 10*3/uL Final     CMP  Sodium   Date Value Ref Range Status   04/21/2025 141 136 - 145 mmol/L Final     Potassium   Date Value Ref Range Status   04/21/2025 3.2 (L) 3.5 - 5.1 mmol/L Final     Chloride   Date Value Ref Range Status   04/21/2025 100  98 - 107 mmol/L Final     CO2   Date Value Ref Range Status   04/21/2025 30 (H) 22 - 29 mmol/L Final     Glucose   Date Value Ref Range Status   02/28/2025 93 70 - 110 mg/dL Final     BUN   Date Value Ref Range Status   04/21/2025 6.0 (L) 8 - 23 mg/dL Final     Creatinine   Date Value Ref Range Status   04/21/2025 0.80 0.50 - 0.90 mg/dL Final     Calcium   Date Value Ref Range Status   04/21/2025 8.7 8.6 - 10.2 mg/dL Final     Total Protein   Date Value Ref Range Status   02/28/2025 6.5 6.4 - 8.2 g/dL Final     Albumin   Date Value Ref Range Status   04/21/2025 3.5 3.5 - 5.2 g/dL Final     Total Bilirubin   Date Value Ref Range Status   02/28/2025 1.8 (H) 0.0 - 1.0 mg/dL Final     Alkaline Phosphatase   Date Value Ref Range Status   02/28/2025 67 46 - 116 U/L Final     AST   Date Value Ref Range Status   04/21/2025 15 0 - 32 U/L Final     ALT   Date Value Ref Range Status   02/28/2025 23 12 - 78 U/L Final     Anion Gap   Date Value Ref Range Status   04/21/2025 11.0 8.0 - 17.0 mmol/L Final     Comment:     NOTE  Testing performed at:  [facility], [address] [place] [address] CLIA #:[id number]          Assessment/Plan:      ECOG 1    Rectal Cancer Stage IIIA  ([id number])   Patient had consult with Dr. Garcia for XRT recommended XRT with xeloda, will likely need short course XRT+Xeloda followed by CapeOx vs FOLFOX or FOLFIRINOX for 12-16 weeks per NCCN guidelines.  Will send out Xeloda, have patient rtc with Dr. Chaudhary prior to starting with cbc cmp and cea  ==1/6/2025:  Patient here today to discuss upcoming chemotherapy/immunotherapy. An extensive discussion was had which included a thorough discussion of potential side effect profile, risk versus benefits, and expected outcomes.  Risks, including but not limited to, possible hair loss, bone marrow damage, damage to body organs, allergic reactions, sterility, nausea/vomiting, constipation/diarrhea, sores in the mouth, secondary cancers, and  rarely death were all discuss.  Specific side effects pertaining to their chemotherapy/immunotherapy medications were discussed as well.  The patient agrees with the plan and all questions and their support systems questions have been answered to their satisfaction.  Premedications were prescribed and patient was educated on appropriate usage.  Patient was educated on when to call, and given the number to call, or to notify the provider including but not limited to:  Persistent nausea and vomiting, dehydration, fever greater than 100.4 lasting over 1 hour induration or isolated feeders greater than 101, rash while on active chemotherapy or immunotherapy, severe pain or new onset pain not controlled by current pain medication regimen.  Patient was scheduled later this week to see Dr. Chaudhary to discuss treatment planning as initial visit was completed with NP because he was out of the office and work up was ordered, however, visit moved up due to XRT is ready to start.  Dr. Chaudhary was physically present for first part of visit when he discussed Xeloda +XRT, goals of treatment, and plan for Xeloda+XRT followed by surgical evaluation and will need chemotherapy after whether FOLFOX or CapeOx.  Following MD visit chemotherapy education was provided, labs drawn today cbc cmp she will start Xeloda+xrt on 1/7/2025.   ==02/06/25:Tolerating Xeloda with XRT well, will finish on 2/14/2025.  She will need to rtc to discuss with Dr. Chaudhary treatment planning. She has next appt with surgeon in [facility] in April.    == 2/14/25:  Status post completion of Xeloda with XRT.  Discussed with patient in detail future management options and to do additional chemotherapy for 12-16 weeks with CapeOx, after completion of short course chemo XRT.  She voiced understanding.  I will obtain placement and send prior authorization  ==03/3/25: Start CapeOx. Xeloda dose 850mg/m2 po bid days 1-14 every 21 day cycle. Days 1-14 every 21 days. CapeOx for  "12-16 weeks. To be followed by restaging scan and evaluation by surgery  ==03/25/2025: to clinic for cycle 2 of CapeOx, anc 1340.  Patient reports after first infusion of Oxaliplatin patient had dizziness, feeling "drunk" ataxia and cramping/clubbing of hands. Patient had PRES from oxaliplatin, discussed with Dr. Chaudhary who reviewed NCCN guidelines and recommended changing from capeox to FOLFIRI/XELERI - will change to XELERI due to patient living an hour away and difficulty with returning for pump d/c.  Will also start pt on po calcium due to mild hypocalcemia, calcium 8.4 and check mg level as low calcium and mag can be associated with PRES  ==04/01/2025:  Patient here today to discuss upcoming chemotherapy/immunotherapy. An extensive discussion was had which included a thorough discussion of potential side effect profile, risk versus benefits, and expected outcomes.  Risks, including but not limited to, possible hair loss, bone marrow damage, damage to body organs, allergic reactions, sterility, nausea/vomiting, constipation/diarrhea, sores in the mouth, secondary cancers, and rarely death were all discuss.  Specific side effects pertaining to their chemotherapy/immunotherapy medications were discussed as well.  The patient agrees with the plan and all questions and their support systems questions have been answered to their satisfaction.  Premedications were prescribed and patient was educated on appropriate usage.  Patient was educated on when to call, and given the number to call, or to notify the provider including but not limited to:  Persistent nausea and vomiting, dehydration, fever greater than 100.4 lasting over 1 hour induration or isolated feeders greater than 101, rash while on active chemotherapy or immunotherapy, severe pain or new onset pain not controlled by current pain medication regimen. Patient will start Irinotecan, had severe delayed reaction (PRES) after oxaliplatin, has appt at [facility] next " week.  ==04/22/2025: Patient had nausea despite zofran with last cycle of chemotherapy, will add compazine and instructed to notify clinic if still has nausea and needs nausea .  She had some hemorrhoids which are better now, used preparation H, pt will notify clinic if needed in future and can consider rx treatment, anemia noted, will add iron studies to labs if possible, if not draw with next labs      Plan:  Rtc 3 weeks cbc cmp prior  Iron studies  D/c CapeOX  continue XELIRI         Total time spent in counseling and discussion about further management options including relevant lab work, treatment,  prognosis, medications and intended side effects was more than 40 minutes. More than 50% of the time was spent on counseling and coordination of care.  This includes face to face time and non-face to face time preparing to see the patient (eg, review of tests), Obtaining and/or reviewing separately obtained history, Documenting clinical information in the electronic or other health record, Independently interpreting resultsand communicating results to the patient/family/caregiver, or Care coordination.

## 2025-04-29 ENCOUNTER — TELEPHONE (OUTPATIENT)
Dept: HEMATOLOGY/ONCOLOGY | Facility: CLINIC | Age: 65
End: 2025-04-29
Payer: MEDICAID

## 2025-04-29 RX ORDER — HEPARIN 100 UNIT/ML
500 SYRINGE INTRAVENOUS
OUTPATIENT
Start: 2025-04-29

## 2025-04-29 RX ORDER — SODIUM CHLORIDE 0.9 % (FLUSH) 0.9 %
10 SYRINGE (ML) INJECTION
OUTPATIENT
Start: 2025-04-29

## 2025-04-29 NOTE — TELEPHONE ENCOUNTER
----- Message from Tomasa sent at 4/29/2025 10:33 AM CDT -----  Patient requesting a nurse call please call her back at [phone]

## 2025-05-01 ENCOUNTER — TELEPHONE (OUTPATIENT)
Dept: HEMATOLOGY/ONCOLOGY | Facility: CLINIC | Age: 65
End: 2025-05-01
Payer: MEDICAID

## 2025-05-01 ENCOUNTER — DOCUMENTATION ONLY (OUTPATIENT)
Dept: HEMATOLOGY/ONCOLOGY | Facility: CLINIC | Age: 65
End: 2025-05-01
Payer: MEDICAID

## 2025-05-01 NOTE — TELEPHONE ENCOUNTER
----- Message from Michael sent at 5/1/2025  9:52 AM CDT -----  Contact: Cayden/  ..Type:  Patient Requesting CallWho Called:Cayden/ Does the patient know what this is regarding?:confused about the conversation he had about her to stop taking a certain medication Would the patient rather a call back or a response via [facility]? Call Best Call Back Number:[phone] Information:  capecicapecitabine (XELODA) 150 MG tablet                                      capecitabine (XELODA) 500 MG TabCall back asap

## 2025-05-06 ENCOUNTER — TELEPHONE (OUTPATIENT)
Dept: HEMATOLOGY/ONCOLOGY | Facility: CLINIC | Age: 65
End: 2025-05-06
Payer: MEDICAID

## 2025-05-06 DIAGNOSIS — Z71.9 ENCOUNTER FOR EDUCATION: Primary | ICD-10-CM

## 2025-05-06 NOTE — TELEPHONE ENCOUNTER
----- Message from Tomasa sent at 5/6/2025  9:14 AM CDT -----  Patient is requesting a call back at [phone]..

## 2025-05-12 NOTE — PROGRESS NOTES
ONCOLOGY FOLLOW UP VISIT CONSULTATION    Patient ID: Pernell Dominguez is a 64 y.o. female.  Patient referred by [facility] colorectal surgery, she presented to [facility] November 2020 with 2 year history of intermittent constipation and abdominal cramps. She underwent     Chief Complaint: Colon Cancer    Pernell Dominguez is here for OP GI Irinotecan Q3W (XELIRI - with xeloda home medication)      Treatment Goal:   Curative      Status:   Active      Start Date:   4/1/2025      End Date:   3/3/2026 (Planned)      Provider:   Amisha Barlow NP      Chemotherapy:   irinotecan (CAMPTOSAR) 350 mg/m2 = 722 mg in 0.9% NaCl 536.1 mL chemo infusion, 350 mg/m2 = 722 mg, Intravenous, Clinic/HOD 1 time, 2 of 17 cycles      Imaging:   10/2/2024 ct chest abd pelvis  Chronic appearing parenchymal findings in the lungs no acute intrathoracic findings, circumferential wall thickening in the lower rectum extending fro just past sigmoid takeoff distally to the level of the anorectal verge approx 7.9cm length, no liver disease appreciated        No past medical history on file.  Family History   Problem Relation Name Age of Onset    Lupus Mother      Stomach cancer Father      Cancer Father      No Known Problems Sister      No Known Problems Brother      Breast cancer Maternal Aunt      Bone cancer Maternal Aunt      Cervical cancer Paternal Grandmother       Social History     Socioeconomic History    Marital status:    Tobacco Use    Smoking status: Never    Smokeless tobacco: Never   Substance and Sexual Activity    Alcohol use: Not Currently    Drug use: Never    Sexual activity: Not Currently     Social Drivers of Health     Financial Resource Strain: Medium Risk (12/11/2024)    Received from [facility]    Overall Financial Resource Strain (CARDIA)     Difficulty of Paying Living Expenses: Somewhat hard   Food Insecurity: No Food Insecurity (12/11/2024)    Received from [facility]    Hunger Vital Sign     Worried About Running  Out of Food in the Last Year: Never true     Ran Out of Food in the Last Year: Never true   Transportation Needs: No Transportation Needs (12/11/2024)    Received from [facility]    PRAPARE - Transportation     Lack of Transportation (Medical): No     Lack of Transportation (Non-Medical): No   Physical Activity: Unknown (12/11/2024)    Received from [facility]    Exercise Vital Sign     Days of Exercise per Week: Patient declined   Stress: Stress Concern Present (12/11/2024)    Received from [facility]    [employer] - Occupational Stress Questionnaire     Feeling of Stress : To some extent   Housing Stability: Unknown (12/11/2024)    Received from [facility]    Housing Stability Vital Sign     Unable to Pay for Housing in the Last Year: No     No past surgical history on file.        Review of systems:  Review of Systems   Constitutional:  Negative for activity change, appetite change, chills, diaphoresis, fatigue, fever and unexpected weight change.   HENT:  Negative for congestion, dental problem, mouth sores, nosebleeds, sore throat, tinnitus and voice change.    Eyes:  Negative for photophobia, discharge and visual disturbance.   Respiratory:  Negative for apnea, cough, choking, chest tightness, shortness of breath, wheezing and stridor.    Cardiovascular:  Negative for chest pain, palpitations and leg swelling.   Gastrointestinal:  Negative for abdominal distention, abdominal pain, anal bleeding, blood in stool, constipation, diarrhea, nausea, rectal pain and vomiting.   Endocrine: Negative for cold intolerance and heat intolerance.   Genitourinary:  Negative for difficulty urinating, dysuria, hematuria, menstrual problem, vaginal bleeding, vaginal discharge and vaginal pain.   Musculoskeletal:  Negative for arthralgias, back pain, gait problem, joint swelling and myalgias.   Skin:  Negative for color change, pallor, rash and wound.   Neurological:  Negative for dizziness,  syncope, light-headedness and numbness.   Hematological:  Negative for adenopathy. Does not bruise/bleed easily.   Psychiatric/Behavioral:  Negative for agitation, confusion, sleep disturbance and suicidal ideas. The patient is not nervous/anxious.        Objective:     Physical Exam  Constitutional:       Appearance: She is well-developed.   HENT:      Head: Normocephalic.   Eyes:      Conjunctiva/sclera: Conjunctivae normal.      Pupils: Pupils are equal, round, and reactive to light.   Cardiovascular:      Rate and Rhythm: Normal rate and regular rhythm.      Heart sounds: Normal heart sounds.   Pulmonary:      Effort: Pulmonary effort is normal.      Breath sounds: Normal breath sounds.   Chest:      Chest wall: No mass, deformity or tenderness.   Breasts:     Breasts are symmetrical.   Abdominal:      General: Bowel sounds are normal.      Palpations: Abdomen is soft.   Musculoskeletal:         General: Normal range of motion.      Cervical back: Normal range of motion and neck supple.   Skin:     General: Skin is warm and dry.   Neurological:      Mental Status: She is alert and oriented to person, place, and time.   Psychiatric:         Behavior: Behavior normal.         Thought Content: Thought content normal.         Judgment: Judgment normal.       There were no vitals filed for this visit.      There is no height or weight on file to calculate BSA.    Labs:  Lab Results   Component Value Date    WBC 3.01 (L) 04/21/2025    HGB 9.4 (L) 04/21/2025    HCT 27.7 (L) 04/21/2025    .0 04/21/2025    LABPLAT 172 02/28/2025      Platelets   Date Value Ref Range Status   02/28/2025 172 142 - 424 10*3/uL Final     CMP  Sodium   Date Value Ref Range Status   04/21/2025 141 136 - 145 mmol/L Final     Potassium   Date Value Ref Range Status   04/21/2025 3.2 (L) 3.5 - 5.1 mmol/L Final     Chloride   Date Value Ref Range Status   04/21/2025 100 98 - 107 mmol/L Final     CO2   Date Value Ref Range Status   04/21/2025  30 (H) 22 - 29 mmol/L Final     Glucose   Date Value Ref Range Status   02/28/2025 93 70 - 110 mg/dL Final     BUN   Date Value Ref Range Status   04/21/2025 6.0 (L) 8 - 23 mg/dL Final     Creatinine   Date Value Ref Range Status   04/21/2025 0.80 0.50 - 0.90 mg/dL Final     Calcium   Date Value Ref Range Status   04/21/2025 8.7 8.6 - 10.2 mg/dL Final     Total Protein   Date Value Ref Range Status   02/28/2025 6.5 6.4 - 8.2 g/dL Final     Albumin   Date Value Ref Range Status   04/21/2025 3.5 3.5 - 5.2 g/dL Final     Total Bilirubin   Date Value Ref Range Status   02/28/2025 1.8 (H) 0.0 - 1.0 mg/dL Final     Alkaline Phosphatase   Date Value Ref Range Status   02/28/2025 67 46 - 116 U/L Final     AST   Date Value Ref Range Status   04/21/2025 15 0 - 32 U/L Final     ALT   Date Value Ref Range Status   02/28/2025 23 12 - 78 U/L Final     Anion Gap   Date Value Ref Range Status   04/21/2025 11.0 8.0 - 17.0 mmol/L Final     Comment:     NOTE  Testing performed at:  [facility], [address] [place] [address] CLIA #:[id number]          Assessment/Plan:      ECOG 1    Rectal Cancer Stage IIIA  ([id number])   Patient had consult with Dr. Garcia for XRT recommended XRT with xeloda, will likely need short course XRT+Xeloda followed by CapeOx vs FOLFOX or FOLFIRINOX for 12-16 weeks per NCCN guidelines.  Will send out Xeloda, have patient rtc with Dr. Chaudhary prior to starting with cbc cmp and cea  ==1/6/2025:  Patient here today to discuss upcoming chemotherapy/immunotherapy. An extensive discussion was had which included a thorough discussion of potential side effect profile, risk versus benefits, and expected outcomes.  Risks, including but not limited to, possible hair loss, bone marrow damage, damage to body organs, allergic reactions, sterility, nausea/vomiting, constipation/diarrhea, sores in the mouth, secondary cancers, and rarely death were all discuss.  Specific side effects pertaining to their  chemotherapy/immunotherapy medications were discussed as well.  The patient agrees with the plan and all questions and their support systems questions have been answered to their satisfaction.  Premedications were prescribed and patient was educated on appropriate usage.  Patient was educated on when to call, and given the number to call, or to notify the provider including but not limited to:  Persistent nausea and vomiting, dehydration, fever greater than 100.4 lasting over 1 hour induration or isolated feeders greater than 101, rash while on active chemotherapy or immunotherapy, severe pain or new onset pain not controlled by current pain medication regimen.  Patient was scheduled later this week to see Dr. Chaudhary to discuss treatment planning as initial visit was completed with NP because he was out of the office and work up was ordered, however, visit moved up due to XRT is ready to start.  Dr. Chaudhary was physically present for first part of visit when he discussed Xeloda +XRT, goals of treatment, and plan for Xeloda+XRT followed by surgical evaluation and will need chemotherapy after whether FOLFOX or CapeOx.  Following MD visit chemotherapy education was provided, labs drawn today cbc cmp she will start Xeloda+xrt on 1/7/2025.   ==02/06/25:Tolerating Xeloda with XRT well, will finish on 2/14/2025.  She will need to rtc to discuss with Dr. Chaudhary treatment planning. She has next appt with surgeon in [facility] in April.    == 2/14/25:  Status post completion of Xeloda with XRT.  Discussed with patient in detail future management options and to do additional chemotherapy for 12-16 weeks with CapeOx, after completion of short course chemo XRT.  She voiced understanding.  I will obtain placement and send prior authorization  ==03/3/25: Start CapeOx. Xeloda dose 850mg/m2 po bid days 1-14 every 21 day cycle. Days 1-14 every 21 days. CapeOx for 12-16 weeks. To be followed by restaging scan and evaluation by  "surgery  ==03/25/2025: to clinic for cycle 2 of CapeOx, anc 1340.  Patient reports after first infusion of Oxaliplatin patient had dizziness, feeling "drunk" ataxia and cramping/clubbing of hands. Patient had PRES from oxaliplatin, discussed with Dr. Chaudhary who reviewed NCCN guidelines and recommended changing from capeox to FOLFIRI/XELERI - will change to XELERI due to patient living an hour away and difficulty with returning for pump d/c.  Will also start pt on po calcium due to mild hypocalcemia, calcium 8.4 and check mg level as low calcium and mag can be associated with PRES  ==04/01/2025:  Patient here today to discuss upcoming chemotherapy/immunotherapy. An extensive discussion was had which included a thorough discussion of potential side effect profile, risk versus benefits, and expected outcomes.  Risks, including but not limited to, possible hair loss, bone marrow damage, damage to body organs, allergic reactions, sterility, nausea/vomiting, constipation/diarrhea, sores in the mouth, secondary cancers, and rarely death were all discuss.  Specific side effects pertaining to their chemotherapy/immunotherapy medications were discussed as well.  The patient agrees with the plan and all questions and their support systems questions have been answered to their satisfaction.  Premedications were prescribed and patient was educated on appropriate usage.  Patient was educated on when to call, and given the number to call, or to notify the provider including but not limited to:  Persistent nausea and vomiting, dehydration, fever greater than 100.4 lasting over 1 hour induration or isolated feeders greater than 101, rash while on active chemotherapy or immunotherapy, severe pain or new onset pain not controlled by current pain medication regimen. Patient will start Irinotecan, had severe delayed reaction (PRES) after oxaliplatin, has appt at [facility] next week.  ==04/22/2025: Patient had nausea despite zofran with last " cycle of chemotherapy, will add compazine and instructed to notify clinic if still has nausea and needs nausea .  She had some hemorrhoids which are better now, used preparation H, pt will notify clinic if needed in future and can consider rx treatment, anemia noted, will add iron studies to labs if possible, if not draw with next labs  ==05/13/2025: PT has abdominal cramps, will start bentyl and have chemo bay admin prn atropine with her irinotecan. May also be related to anxiety, will send out hydroxyzine prn.  Pt reports abdominal pain, has allergy to codeine which is actually a side effect issue, not allergy, she has nausea with pain medication, reports she has taken tramadol before and was ok, but can not take hydrocodone, rx for tramadol sent out pt may take ondansetron with it if any nausea. Labs reviewed, pt is cleared for chemo      Plan:  Rtc 3 weeks cbc cmp prior  Iron studies  D/c CapeOX  continue XELIRI         Total time spent in counseling and discussion about further management options including relevant lab work, treatment,  prognosis, medications and intended side effects was more than 40 minutes. More than 50% of the time was spent on counseling and coordination of care.  This includes face to face time and non-face to face time preparing to see the patient (eg, review of tests), Obtaining and/or reviewing separately obtained history, Documenting clinical information in the electronic or other health record, Independently interpreting resultsand communicating results to the patient/family/caregiver, or Care coordination.

## 2025-05-13 ENCOUNTER — OFFICE VISIT (OUTPATIENT)
Dept: HEMATOLOGY/ONCOLOGY | Facility: CLINIC | Age: 65
End: 2025-05-13
Payer: MEDICAID

## 2025-05-13 VITALS
OXYGEN SATURATION: 95 % | BODY MASS INDEX: 34.23 KG/M2 | SYSTOLIC BLOOD PRESSURE: 115 MMHG | HEART RATE: 61 BPM | WEIGHT: 200.5 LBS | HEIGHT: 64 IN | DIASTOLIC BLOOD PRESSURE: 77 MMHG | RESPIRATION RATE: 16 BRPM | TEMPERATURE: 98 F

## 2025-05-13 DIAGNOSIS — R11.2 CHEMOTHERAPY-INDUCED NAUSEA AND VOMITING: ICD-10-CM

## 2025-05-13 DIAGNOSIS — C20 RECTAL CANCER: Primary | ICD-10-CM

## 2025-05-13 DIAGNOSIS — D50.0 ANEMIA DUE TO CHRONIC BLOOD LOSS: ICD-10-CM

## 2025-05-13 DIAGNOSIS — T45.1X5A CHEMOTHERAPY-INDUCED NAUSEA AND VOMITING: ICD-10-CM

## 2025-05-13 PROCEDURE — 3074F SYST BP LT 130 MM HG: CPT | Mod: CPTII,,, | Performed by: NURSE PRACTITIONER

## 2025-05-13 PROCEDURE — 1159F MED LIST DOCD IN RCRD: CPT | Mod: CPTII,,, | Performed by: NURSE PRACTITIONER

## 2025-05-13 PROCEDURE — 3008F BODY MASS INDEX DOCD: CPT | Mod: CPTII,,, | Performed by: NURSE PRACTITIONER

## 2025-05-13 PROCEDURE — 3078F DIAST BP <80 MM HG: CPT | Mod: CPTII,,, | Performed by: NURSE PRACTITIONER

## 2025-05-13 PROCEDURE — 4010F ACE/ARB THERAPY RXD/TAKEN: CPT | Mod: CPTII,,, | Performed by: NURSE PRACTITIONER

## 2025-05-13 PROCEDURE — 99215 OFFICE O/P EST HI 40 MIN: CPT | Mod: S$PBB,,, | Performed by: NURSE PRACTITIONER

## 2025-05-13 RX ORDER — PROCHLORPERAZINE EDISYLATE 5 MG/ML
10 INJECTION INTRAMUSCULAR; INTRAVENOUS ONCE AS NEEDED
Status: CANCELLED | OUTPATIENT
Start: 2025-05-13

## 2025-05-13 RX ORDER — ATROPINE SULFATE 0.4 MG/ML
0.4 INJECTION, SOLUTION ENDOTRACHEAL; INTRAMEDULLARY; INTRAMUSCULAR; INTRAVENOUS; SUBCUTANEOUS ONCE AS NEEDED
Status: CANCELLED | OUTPATIENT
Start: 2025-05-13

## 2025-05-13 RX ORDER — SODIUM CHLORIDE 0.9 % (FLUSH) 0.9 %
10 SYRINGE (ML) INJECTION
Status: CANCELLED | OUTPATIENT
Start: 2025-05-13

## 2025-05-13 RX ORDER — DIPHENHYDRAMINE HYDROCHLORIDE 50 MG/ML
50 INJECTION, SOLUTION INTRAMUSCULAR; INTRAVENOUS ONCE AS NEEDED
Status: CANCELLED | OUTPATIENT
Start: 2025-05-13

## 2025-05-13 RX ORDER — EPINEPHRINE 0.3 MG/.3ML
0.3 INJECTION SUBCUTANEOUS ONCE AS NEEDED
Status: CANCELLED | OUTPATIENT
Start: 2025-05-13

## 2025-05-13 RX ORDER — HYDROXYZINE PAMOATE 25 MG/1
25 CAPSULE ORAL EVERY 8 HOURS PRN
Qty: 60 CAPSULE | Refills: 0 | Status: SHIPPED | OUTPATIENT
Start: 2025-05-13

## 2025-05-13 RX ORDER — HEPARIN 100 UNIT/ML
500 SYRINGE INTRAVENOUS
Status: CANCELLED | OUTPATIENT
Start: 2025-05-13

## 2025-05-13 RX ORDER — TRAMADOL HYDROCHLORIDE 50 MG/1
50 TABLET, FILM COATED ORAL EVERY 8 HOURS PRN
Qty: 60 TABLET | Refills: 0 | Status: SHIPPED | OUTPATIENT
Start: 2025-05-13

## 2025-05-13 RX ORDER — DICYCLOMINE HYDROCHLORIDE 10 MG/1
10 CAPSULE ORAL
Qty: 120 CAPSULE | Refills: 0 | Status: SHIPPED | OUTPATIENT
Start: 2025-05-13 | End: 2025-06-12

## 2025-05-30 ENCOUNTER — TELEPHONE (OUTPATIENT)
Dept: HEMATOLOGY/ONCOLOGY | Facility: CLINIC | Age: 65
End: 2025-05-30
Payer: MEDICAID

## 2025-05-30 NOTE — TELEPHONE ENCOUNTER
Copied from CRM #[id number]. Topic: General Inquiry - Patient Advice  >> May 30, 2025 12:05 PM Tomasa wrote:  Patient is requesting a call back patient is having trouble breathing

## 2025-06-03 ENCOUNTER — OFFICE VISIT (OUTPATIENT)
Dept: HEMATOLOGY/ONCOLOGY | Facility: CLINIC | Age: 65
End: 2025-06-03
Payer: MEDICAID

## 2025-06-03 VITALS
WEIGHT: 197 LBS | BODY MASS INDEX: 33.63 KG/M2 | DIASTOLIC BLOOD PRESSURE: 80 MMHG | RESPIRATION RATE: 16 BRPM | TEMPERATURE: 98 F | SYSTOLIC BLOOD PRESSURE: 118 MMHG | HEIGHT: 64 IN | HEART RATE: 70 BPM | OXYGEN SATURATION: 97 %

## 2025-06-03 DIAGNOSIS — K52.1 DIARRHEA DUE TO DRUG: ICD-10-CM

## 2025-06-03 DIAGNOSIS — C20 RECTAL CANCER: Primary | ICD-10-CM

## 2025-06-03 DIAGNOSIS — D50.0 ANEMIA DUE TO CHRONIC BLOOD LOSS: ICD-10-CM

## 2025-06-03 PROCEDURE — 3008F BODY MASS INDEX DOCD: CPT | Mod: CPTII,,, | Performed by: NURSE PRACTITIONER

## 2025-06-03 PROCEDURE — G2211 COMPLEX E/M VISIT ADD ON: HCPCS | Mod: S$PBB,,, | Performed by: NURSE PRACTITIONER

## 2025-06-03 PROCEDURE — 1159F MED LIST DOCD IN RCRD: CPT | Mod: CPTII,,, | Performed by: NURSE PRACTITIONER

## 2025-06-03 PROCEDURE — 3079F DIAST BP 80-89 MM HG: CPT | Mod: CPTII,,, | Performed by: NURSE PRACTITIONER

## 2025-06-03 PROCEDURE — 99215 OFFICE O/P EST HI 40 MIN: CPT | Mod: S$PBB,,, | Performed by: NURSE PRACTITIONER

## 2025-06-03 PROCEDURE — 3074F SYST BP LT 130 MM HG: CPT | Mod: CPTII,,, | Performed by: NURSE PRACTITIONER

## 2025-06-03 PROCEDURE — 4010F ACE/ARB THERAPY RXD/TAKEN: CPT | Mod: CPTII,,, | Performed by: NURSE PRACTITIONER

## 2025-06-03 RX ORDER — ATROPINE SULFATE 0.4 MG/ML
0.4 INJECTION, SOLUTION ENDOTRACHEAL; INTRAMEDULLARY; INTRAMUSCULAR; INTRAVENOUS; SUBCUTANEOUS ONCE AS NEEDED
Status: CANCELLED | OUTPATIENT
Start: 2025-06-03

## 2025-06-03 RX ORDER — DIPHENHYDRAMINE HYDROCHLORIDE 50 MG/ML
50 INJECTION, SOLUTION INTRAMUSCULAR; INTRAVENOUS ONCE AS NEEDED
Status: CANCELLED | OUTPATIENT
Start: 2025-06-03

## 2025-06-03 RX ORDER — HEPARIN 100 UNIT/ML
500 SYRINGE INTRAVENOUS
Status: CANCELLED | OUTPATIENT
Start: 2025-06-03

## 2025-06-03 RX ORDER — CHOLESTYRAMINE 4 G/9G
4 POWDER, FOR SUSPENSION ORAL
Qty: 270 PACKET | Refills: 3 | Status: SHIPPED | OUTPATIENT
Start: 2025-06-03 | End: 2025-06-03

## 2025-06-03 RX ORDER — EPINEPHRINE 0.3 MG/.3ML
0.3 INJECTION SUBCUTANEOUS ONCE AS NEEDED
Status: CANCELLED | OUTPATIENT
Start: 2025-06-03

## 2025-06-03 RX ORDER — SODIUM CHLORIDE 0.9 % (FLUSH) 0.9 %
10 SYRINGE (ML) INJECTION
Status: CANCELLED | OUTPATIENT
Start: 2025-06-03

## 2025-06-03 RX ORDER — PROCHLORPERAZINE EDISYLATE 5 MG/ML
10 INJECTION INTRAMUSCULAR; INTRAVENOUS ONCE AS NEEDED
Status: CANCELLED | OUTPATIENT
Start: 2025-06-03

## 2025-06-24 ENCOUNTER — OFFICE VISIT (OUTPATIENT)
Dept: HEMATOLOGY/ONCOLOGY | Facility: CLINIC | Age: 65
End: 2025-06-24
Payer: MEDICAID

## 2025-06-24 VITALS
SYSTOLIC BLOOD PRESSURE: 138 MMHG | RESPIRATION RATE: 16 BRPM | DIASTOLIC BLOOD PRESSURE: 83 MMHG | OXYGEN SATURATION: 95 % | HEIGHT: 64 IN | TEMPERATURE: 98 F | BODY MASS INDEX: 33.24 KG/M2 | HEART RATE: 75 BPM | WEIGHT: 194.69 LBS

## 2025-06-24 DIAGNOSIS — D50.0 ANEMIA DUE TO CHRONIC BLOOD LOSS: ICD-10-CM

## 2025-06-24 DIAGNOSIS — K52.1 DIARRHEA DUE TO DRUG: ICD-10-CM

## 2025-06-24 DIAGNOSIS — C20 RECTAL CANCER: Primary | ICD-10-CM

## 2025-06-24 PROCEDURE — 99215 OFFICE O/P EST HI 40 MIN: CPT | Mod: S$PBB,,, | Performed by: NURSE PRACTITIONER

## 2025-06-24 PROCEDURE — 1159F MED LIST DOCD IN RCRD: CPT | Mod: CPTII,,, | Performed by: NURSE PRACTITIONER

## 2025-06-24 PROCEDURE — 3079F DIAST BP 80-89 MM HG: CPT | Mod: CPTII,,, | Performed by: NURSE PRACTITIONER

## 2025-06-24 PROCEDURE — G2211 COMPLEX E/M VISIT ADD ON: HCPCS | Mod: ,,, | Performed by: NURSE PRACTITIONER

## 2025-06-24 PROCEDURE — 3075F SYST BP GE 130 - 139MM HG: CPT | Mod: CPTII,,, | Performed by: NURSE PRACTITIONER

## 2025-06-24 PROCEDURE — 4010F ACE/ARB THERAPY RXD/TAKEN: CPT | Mod: CPTII,,, | Performed by: NURSE PRACTITIONER

## 2025-06-24 PROCEDURE — 3008F BODY MASS INDEX DOCD: CPT | Mod: CPTII,,, | Performed by: NURSE PRACTITIONER

## 2025-06-24 RX ORDER — ATROPINE SULFATE 0.4 MG/ML
0.4 INJECTION, SOLUTION ENDOTRACHEAL; INTRAMEDULLARY; INTRAMUSCULAR; INTRAVENOUS; SUBCUTANEOUS ONCE AS NEEDED
Status: CANCELLED | OUTPATIENT
Start: 2025-06-24

## 2025-06-24 RX ORDER — SODIUM CHLORIDE 0.9 % (FLUSH) 0.9 %
10 SYRINGE (ML) INJECTION
Status: CANCELLED | OUTPATIENT
Start: 2025-06-24

## 2025-06-24 RX ORDER — HEPARIN 100 UNIT/ML
500 SYRINGE INTRAVENOUS
Status: CANCELLED | OUTPATIENT
Start: 2025-06-24

## 2025-06-24 RX ORDER — EPINEPHRINE 0.3 MG/.3ML
0.3 INJECTION SUBCUTANEOUS ONCE AS NEEDED
Status: CANCELLED | OUTPATIENT
Start: 2025-06-24

## 2025-06-24 RX ORDER — PROCHLORPERAZINE EDISYLATE 5 MG/ML
10 INJECTION INTRAMUSCULAR; INTRAVENOUS ONCE AS NEEDED
Status: CANCELLED | OUTPATIENT
Start: 2025-06-24

## 2025-06-24 RX ORDER — DIPHENHYDRAMINE HYDROCHLORIDE 50 MG/ML
50 INJECTION, SOLUTION INTRAMUSCULAR; INTRAVENOUS ONCE AS NEEDED
Status: CANCELLED | OUTPATIENT
Start: 2025-06-24

## 2025-06-24 NOTE — PROGRESS NOTES
ONCOLOGY FOLLOW UP VISIT CONSULTATION    Patient ID: Pernell Dominguez is a 64 y.o. female.  Patient referred by [facility] colorectal surgery, she presented to [facility] November 2020 with 2 year history of intermittent constipation and abdominal cramps. She underwent     Chief Complaint: Colon Cancer    Pernell Dominguez is here for OP GI Irinotecan Q3W (XELIRI - with xeloda home medication)      Treatment Goal:   Curative      Status:   Active      Start Date:   4/1/2025      End Date:   3/3/2026 (Planned)      Provider:   Amisha Barlow NP      Chemotherapy:   irinotecan (CAMPTOSAR) 350 mg/m2 = 722 mg in 0.9% NaCl 536.1 mL chemo infusion, 350 mg/m2 = 722 mg, Intravenous, Clinic/HOD 1 time, 4 of 17 cycles      Imaging:   10/2/2024 ct chest abd pelvis  Chronic appearing parenchymal findings in the lungs no acute intrathoracic findings, circumferential wall thickening in the lower rectum extending fro just past sigmoid takeoff distally to the level of the anorectal verge approx 7.9cm length, no liver disease appreciated        No past medical history on file.  Family History   Problem Relation Name Age of Onset    Lupus Mother      Stomach cancer Father      Cancer Father      No Known Problems Sister      No Known Problems Brother      Breast cancer Maternal Aunt      Bone cancer Maternal Aunt      Cervical cancer Paternal Grandmother       Social History     Socioeconomic History    Marital status:    Tobacco Use    Smoking status: Never    Smokeless tobacco: Never   Substance and Sexual Activity    Alcohol use: Not Currently    Drug use: Never    Sexual activity: Not Currently     Social Drivers of Health     Financial Resource Strain: Medium Risk (12/11/2024)    [place] [facility]    Overall Financial Resource Strain (CARDIA)     Difficulty of Paying Living Expenses: Somewhat hard   Food Insecurity: No Food Insecurity (12/11/2024)    [place] [facility]    Hunger Vital Sign     Worried About Running  Out of Food in the Last Year: Never true     Ran Out of Food in the Last Year: Never true   Transportation Needs: No Transportation Needs (12/11/2024)    Received from [facility]    PRAPARE - Transportation     Lack of Transportation (Medical): No     Lack of Transportation (Non-Medical): No   Physical Activity: Unknown (12/11/2024)    Received from [facility]    Exercise Vital Sign     Days of Exercise per Week: Patient declined   Stress: Stress Concern Present (12/11/2024)    Received from [facility]    [place] [employer] - Occupational Stress Questionnaire     Feeling of Stress : To some extent   Housing Stability: Unknown (12/11/2024)    Received from [facility]    Housing Stability Vital Sign     Unable to Pay for Housing in the Last Year: No     No past surgical history on file.        Review of systems:  Review of Systems   Constitutional:  Negative for activity change, appetite change, chills, diaphoresis, fatigue, fever and unexpected weight change.   HENT:  Negative for congestion, dental problem, mouth sores, nosebleeds, sore throat, tinnitus and voice change.    Eyes:  Negative for photophobia, discharge and visual disturbance.   Respiratory:  Negative for apnea, cough, choking, chest tightness, shortness of breath, wheezing and stridor.    Cardiovascular:  Negative for chest pain, palpitations and leg swelling.   Gastrointestinal:  Negative for abdominal distention, abdominal pain, anal bleeding, blood in stool, constipation, diarrhea, nausea, rectal pain and vomiting.   Endocrine: Negative for cold intolerance and heat intolerance.   Genitourinary:  Negative for difficulty urinating, dysuria, hematuria, menstrual problem, vaginal bleeding, vaginal discharge and vaginal pain.   Musculoskeletal:  Negative for arthralgias, back pain, gait problem, joint swelling and myalgias.   Skin:  Negative for color change, pallor, rash and wound.   Neurological:  Negative for dizziness,  syncope, light-headedness and numbness.   Hematological:  Negative for adenopathy. Does not bruise/bleed easily.   Psychiatric/Behavioral:  Negative for agitation, confusion, sleep disturbance and suicidal ideas. The patient is not nervous/anxious.        Objective:     Physical Exam  Constitutional:       Appearance: She is well-developed.   HENT:      Head: Normocephalic.   Eyes:      Conjunctiva/sclera: Conjunctivae normal.      Pupils: Pupils are equal, round, and reactive to light.   Cardiovascular:      Rate and Rhythm: Normal rate and regular rhythm.      Heart sounds: Normal heart sounds.   Pulmonary:      Effort: Pulmonary effort is normal.      Breath sounds: Normal breath sounds.   Chest:      Chest wall: No mass, deformity or tenderness.   Breasts:     Breasts are symmetrical.   Abdominal:      General: Bowel sounds are normal.      Palpations: Abdomen is soft.   Musculoskeletal:         General: Normal range of motion.      Cervical back: Normal range of motion and neck supple.   Skin:     General: Skin is warm and dry.   Neurological:      Mental Status: She is alert and oriented to person, place, and time.   Psychiatric:         Behavior: Behavior normal.         Thought Content: Thought content normal.         Judgment: Judgment normal.       There were no vitals filed for this visit.      There is no height or weight on file to calculate BSA.    Labs:  Lab Results   Component Value Date    WBC 2.70 (L) 06/23/2025    HGB 7.4 (L) 06/23/2025    HCT 22.9 (L) 06/23/2025    .7 (H) 06/23/2025    LABPLAT 172 02/28/2025      Platelets   Date Value Ref Range Status   02/28/2025 172 142 - 424 10*3/uL Final     CMP  Sodium   Date Value Ref Range Status   06/23/2025 143 136 - 145 mmol/L Final     Potassium   Date Value Ref Range Status   06/23/2025 3.2 (L) 3.5 - 5.1 mmol/L Final     Chloride   Date Value Ref Range Status   06/23/2025 101 98 - 107 mmol/L Final     CO2   Date Value Ref Range Status    06/23/2025 29 22 - 29 mmol/L Final     Glucose   Date Value Ref Range Status   02/28/2025 93 70 - 110 mg/dL Final     BUN   Date Value Ref Range Status   06/23/2025 7.9 (L) 8 - 23 mg/dL Final     Creatinine   Date Value Ref Range Status   06/23/2025 0.82 0.50 - 0.90 mg/dL Final     Calcium   Date Value Ref Range Status   06/23/2025 8.5 (L) 8.6 - 10.2 mg/dL Final     Total Protein   Date Value Ref Range Status   02/28/2025 6.5 6.4 - 8.2 g/dL Final     Albumin   Date Value Ref Range Status   06/23/2025 3.5 3.5 - 5.2 g/dL Final     Total Bilirubin   Date Value Ref Range Status   02/28/2025 1.8 (H) 0.0 - 1.0 mg/dL Final     Alkaline Phosphatase   Date Value Ref Range Status   02/28/2025 67 46 - 116 U/L Final     AST   Date Value Ref Range Status   06/23/2025 11 0 - 32 U/L Final     ALT   Date Value Ref Range Status   02/28/2025 23 12 - 78 U/L Final     Anion Gap   Date Value Ref Range Status   06/23/2025 13.0 8.0 - 17.0 mmol/L Final     Comment:     NOTE  Testing performed at:  [facility], [address] [place] [address] CLIA #:[id number]          Assessment/Plan:      ECOG 1    Rectal Cancer Stage IIIA  ([id number])   Patient had consult with Dr. Garcia for XRT recommended XRT with xeloda, will likely need short course XRT+Xeloda followed by CapeOx vs FOLFOX or FOLFIRINOX for 12-16 weeks per NCCN guidelines.  Will send out Xeloda, have patient rtc with Dr. Chaudhary prior to starting with cbc cmp and cea  ==1/6/2025:  Patient here today to discuss upcoming chemotherapy/immunotherapy. An extensive discussion was had which included a thorough discussion of potential side effect profile, risk versus benefits, and expected outcomes.  Risks, including but not limited to, possible hair loss, bone marrow damage, damage to body organs, allergic reactions, sterility, nausea/vomiting, constipation/diarrhea, sores in the mouth, secondary cancers, and rarely death were all discuss.  Specific side effects pertaining  to their chemotherapy/immunotherapy medications were discussed as well.  The patient agrees with the plan and all questions and their support systems questions have been answered to their satisfaction.  Premedications were prescribed and patient was educated on appropriate usage.  Patient was educated on when to call, and given the number to call, or to notify the provider including but not limited to:  Persistent nausea and vomiting, dehydration, fever greater than 100.4 lasting over 1 hour induration or isolated feeders greater than 101, rash while on active chemotherapy or immunotherapy, severe pain or new onset pain not controlled by current pain medication regimen.  Patient was scheduled later this week to see Dr. Chaudhary to discuss treatment planning as initial visit was completed with NP because he was out of the office and work up was ordered, however, visit moved up due to XRT is ready to start.  Dr. Chaudhary was physically present for first part of visit when he discussed Xeloda +XRT, goals of treatment, and plan for Xeloda+XRT followed by surgical evaluation and will need chemotherapy after whether FOLFOX or CapeOx.  Following MD visit chemotherapy education was provided, labs drawn today cbc cmp she will start Xeloda+xrt on 1/7/2025.   ==02/06/25:Tolerating Xeloda with XRT well, will finish on 2/14/2025.  She will need to rtc to discuss with Dr. Chaudhary treatment planning. She has next appt with surgeon in [facility] in April.    == 2/14/25:  Status post completion of Xeloda with XRT.  Discussed with patient in detail future management options and to do additional chemotherapy for 12-16 weeks with CapeOx, after completion of short course chemo XRT.  She voiced understanding.  I will obtain placement and send prior authorization  ==03/3/25: Start CapeOx. Xeloda dose 850mg/m2 po bid days 1-14 every 21 day cycle. Days 1-14 every 21 days. CapeOx for 12-16 weeks. To be followed by restaging scan and evaluation by  "surgery  ==03/25/2025: to clinic for cycle 2 of CapeOx, anc 1340.  Patient reports after first infusion of Oxaliplatin patient had dizziness, feeling "drunk" ataxia and cramping/clubbing of hands. Patient had PRES from oxaliplatin, discussed with Dr. Chaudhary who reviewed NCCN guidelines and recommended changing from capeox to FOLFIRI/XELERI - will change to XELERI due to patient living an hour away and difficulty with returning for pump d/c.  Will also start pt on po calcium due to mild hypocalcemia, calcium 8.4 and check mg level as low calcium and mag can be associated with PRES  ==04/01/2025: Patient will start Irinotecan, had severe delayed reaction (PRES) after oxaliplatin, has appt at [facility] next week.  ==05/13/2025: PT has abdominal cramps, will start bentyl and have chemo bay admin prn atropine with her irinotecan. May also be related to anxiety, will send out hydroxyzine prn.  Pt reports abdominal pain, has allergy to codeine which is actually a side effect issue, not allergy, she has nausea with pain medication, reports she has taken tramadol before and was ok, but can not take hydrocodone, rx for tramadol sent out pt may take ondansetron with it if any nausea. Labs reviewed, pt is cleared for chemo  06/24/2025: Pt due for 6th (first cycle was capeox then switched to xeliri)cycle of chemotherapy will see surgeon in [facility] on 7/8/2025, labs reviewed, pt is cleared for chemo, will see pt back after surgical evaluation      Plan:  Rtc 3 weeks cbc cmp prior    D/c CapeOX  continue XELIRI         Total time spent in counseling and discussion about further management options including relevant lab work, treatment,  prognosis, medications and intended side effects was more than 40 minutes. More than 50% of the time was spent on counseling and coordination of care.  This includes face to face time and non-face to face time preparing to see the patient (eg, review of tests), Obtaining and/or reviewing separately " obtained history, Documenting clinical information in the electronic or other health record, Independently interpreting resultsand communicating results to the patient/family/caregiver, or Care coordination.

## 2025-07-03 ENCOUNTER — TELEPHONE (OUTPATIENT)
Dept: HEMATOLOGY/ONCOLOGY | Facility: CLINIC | Age: 65
End: 2025-07-03
Payer: MEDICAID

## 2025-07-03 NOTE — TELEPHONE ENCOUNTER
Pt is admitted at [facility] and wanted to let our office know. States she has gallblader infection and is on antibiotics. Instructed her to stop taking chemo pills at this time. She verbalized understanding. RONNY Lion notified. Instructed Shani to call back with any other needs.          Copied from CRM #[id number]. Topic: Appointments - Appointment Access  >> Jul 3, 2025  9:02 AM Nae wrote:  Type:  Needs Medical Advice    Who Called:   Symptoms (please be specific): na   How long has patient had these symptoms:  na  Pharmacy name and phone #:  na  Would the patient rather a call back or a response via [facility]? call  Best Call Back Number: [phone]  Additional Information: requesting to speak with office as patient is in hospital requiring surgery. Administered last chemo through port last week and losing a lot of blood and vomiting. Hasn't taken her chemo pills in the last two days.

## 2025-07-17 ENCOUNTER — TELEPHONE (OUTPATIENT)
Dept: HEMATOLOGY/ONCOLOGY | Facility: CLINIC | Age: 65
End: 2025-07-17
Payer: MEDICAID

## 2025-07-17 NOTE — TELEPHONE ENCOUNTER
Copied from CRM #[id number]. Topic: General Inquiry - Patient Advice  >> Jul 17, 2025  4:31 PM Yessenia wrote:  Type:  Needs Medical Advice    Who Called: Dr. Perez  Symptoms (please be specific):  surgery  How long has patient had these symptoms:    Pharmacy name and phone #:    Would the patient rather a call back or a response via [facility]? Call back  Best Call Back Number:  [phone] cell  Additional Information: The patient is having a big surgery, APR, and she needs info on her chemotherapy before the surgery is performed on Tuesday. Please call back [phone].

## 2025-07-17 NOTE — TELEPHONE ENCOUNTER
Returned call to Dr. Viri Perez and left a voicemail message stating that this patient took her last chemo pill Xeloda on 7/03/2025 and that she will come back to see us on 8/04/2025 and that is when she should resume taking her chemo pill per Nohemy Poole.

## 2025-07-18 NOTE — TELEPHONE ENCOUNTER
Spoke with Colorectal Surgeon in [facility] and provided her with the requested information and details on patient's completed course of XRT and chemotherapy as documented in her visit encounters.  Provided her with the details of the current active treatment plan cycles as written, with last documented chemotherapy infusion administered on 6/24/25.  Advised that per our records patient was advised to stop taking Xeloda on 7/3/25 while hospitalized locally and next scheduled follow up in clinic is on 8/4/25 post surgical evaluation/treatment.    LO 7/18/25 @4:29pm

## 2025-08-22 ENCOUNTER — OFFICE VISIT (OUTPATIENT)
Dept: HEMATOLOGY/ONCOLOGY | Facility: CLINIC | Age: 65
End: 2025-08-22
Payer: MEDICAID

## 2025-08-22 VITALS
DIASTOLIC BLOOD PRESSURE: 85 MMHG | TEMPERATURE: 98 F | SYSTOLIC BLOOD PRESSURE: 167 MMHG | RESPIRATION RATE: 16 BRPM | HEIGHT: 64 IN | OXYGEN SATURATION: 97 % | HEART RATE: 68 BPM | WEIGHT: 180.38 LBS | BODY MASS INDEX: 30.8 KG/M2

## 2025-08-22 DIAGNOSIS — Z93.3 COLOSTOMY PRESENT: ICD-10-CM

## 2025-08-22 DIAGNOSIS — Z85.048 HISTORY OF RECTAL CANCER: Primary | ICD-10-CM
